# Patient Record
Sex: FEMALE | URBAN - METROPOLITAN AREA
[De-identification: names, ages, dates, MRNs, and addresses within clinical notes are randomized per-mention and may not be internally consistent; named-entity substitution may affect disease eponyms.]

---

## 2024-09-03 ENCOUNTER — TELEPHONE (OUTPATIENT)
Dept: PSYCHOLOGY | Facility: CLINIC | Age: 49
End: 2024-09-03

## 2024-09-05 ENCOUNTER — TELEPHONE (OUTPATIENT)
Dept: PSYCHOLOGY | Facility: CLINIC | Age: 49
End: 2024-09-05

## 2024-09-06 ENCOUNTER — DOCUMENTATION (OUTPATIENT)
Dept: PSYCHIATRY | Facility: CLINIC | Age: 49
End: 2024-09-06

## 2024-09-06 ENCOUNTER — OFFICE VISIT (OUTPATIENT)
Dept: PSYCHIATRY | Facility: CLINIC | Age: 49
End: 2024-09-06
Payer: MEDICARE

## 2024-09-06 ENCOUNTER — OFFICE VISIT (OUTPATIENT)
Dept: PSYCHOLOGY | Facility: CLINIC | Age: 49
End: 2024-09-06
Payer: MEDICARE

## 2024-09-06 DIAGNOSIS — F43.10 PTSD (POST-TRAUMATIC STRESS DISORDER): Chronic | ICD-10-CM

## 2024-09-06 DIAGNOSIS — F31.32 BIPOLAR AFFECTIVE DISORDER, CURRENTLY DEPRESSED, MODERATE (HCC): Primary | ICD-10-CM

## 2024-09-06 DIAGNOSIS — F41.1 GAD (GENERALIZED ANXIETY DISORDER): Chronic | ICD-10-CM

## 2024-09-06 PROBLEM — F90.9 ATTENTION DEFICIT HYPERACTIVITY DISORDER (ADHD): Chronic | Status: ACTIVE | Noted: 2017-03-13

## 2024-09-06 PROBLEM — F31.62 BIPOLAR DISORDER, CURRENT EPISODE MIXED, MODERATE (HCC): Status: ACTIVE | Noted: 2023-08-16

## 2024-09-06 PROCEDURE — G0410 GRP PSYCH PARTIAL HOSP 45-50: HCPCS

## 2024-09-06 PROCEDURE — 90792 PSYCH DIAG EVAL W/MED SRVCS: CPT | Performed by: STUDENT IN AN ORGANIZED HEALTH CARE EDUCATION/TRAINING PROGRAM

## 2024-09-06 PROCEDURE — G0177 OPPS/PHP; TRAIN & EDUC SERV: HCPCS

## 2024-09-06 PROCEDURE — G0176 OPPS/PHP;ACTIVITY THERAPY: HCPCS

## 2024-09-06 RX ORDER — BUPROPION HYDROCHLORIDE 100 MG/1
100 TABLET, EXTENDED RELEASE ORAL DAILY
COMMUNITY
Start: 2024-07-28 | End: 2024-09-06

## 2024-09-06 RX ORDER — TRAZODONE HYDROCHLORIDE 50 MG/1
50 TABLET, FILM COATED ORAL
COMMUNITY

## 2024-09-06 RX ORDER — OLANZAPINE 2.5 MG/1
2.5 TABLET, FILM COATED ORAL
COMMUNITY
Start: 2024-08-29

## 2024-09-06 RX ORDER — PRAZOSIN HYDROCHLORIDE 1 MG/1
2 CAPSULE ORAL DAILY
COMMUNITY
Start: 2024-07-02

## 2024-09-06 RX ORDER — TOPIRAMATE 200 MG/1
200 TABLET, FILM COATED ORAL DAILY
COMMUNITY

## 2024-09-06 RX ORDER — BUPROPION HYDROCHLORIDE 150 MG/1
150 TABLET ORAL DAILY
Qty: 30 TABLET | Refills: 1 | Status: SHIPPED | OUTPATIENT
Start: 2024-09-06

## 2024-09-06 RX ORDER — DEXTROAMPHETAMINE SACCHARATE, AMPHETAMINE ASPARTATE MONOHYDRATE, DEXTROAMPHETAMINE SULFATE AND AMPHETAMINE SULFATE 5; 5; 5; 5 MG/1; MG/1; MG/1; MG/1
30 CAPSULE, EXTENDED RELEASE ORAL EVERY MORNING
COMMUNITY

## 2024-09-06 RX ORDER — HYDROXYZINE HYDROCHLORIDE 25 MG/1
25 TABLET, FILM COATED ORAL 3 TIMES DAILY
COMMUNITY
Start: 2024-08-02
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
## 2024-09-06 NOTE — PSYCH
[facility] Insurance Authorization for Treatment        Subjective:     Patient ID: Zari Nelson is a 49 y.o. female.       Zari Nelson has Medicare as the primary insurance. No contact needed.  Was informed of attendance policy.      Therapist: SHANEL Hurtado

## 2024-09-06 NOTE — BH TREATMENT PLAN
"Assessment/Plan:      Diagnoses and all orders for this visit:    Bipolar affective disorder, currently depressed, moderate (HCC)    EAGLE (generalized anxiety disorder)    PTSD (post-traumatic stress disorder)          Subjective:     Patient ID: Zari Nelson is a 49 y.o. female.    [facility] Treatment Plan   AREAS OF NEED: Bipolar affective disorder, currently depressed, moderate, EAGLE, and PTSD as evidenced by struggling with concentration issues, excessive crying, isolation, decrease in appetite having lost 7lb in a short amount of time,difficulties with sleep, nightmares and has avoidant behavior due to past traumas and work stressors.  Date Initiated: 09/06/24    Strengths:  \"I raised 12 children \"      LONG TERM GOAL:   Date Initiated: 09/06/24  1.0 I will identify and share three ways in which my day-to-day functioning has improved since attending program.  Target Date: 10/4/24  Completion Date:       SHORT TERM OBJECTIVES:     Date Initiated: 09/06/24  1.1 I will attend group daily and actively participate in at least two groups per day to build a natural support network and feel more socially connected to improve my moods.  Revision Date:   Target Date: 9/18/24  Completion Date:     Date Initiated: 09/06/24  1.2 I will decrease negative statements of self while increasing the positive statements of self and will acknowledge the compliments of others in order to increase my self-esteem.  Revision Date:   Target Date: 9/18/24  Completion Date:    Date Initiated: 09/06/24  1.3 I will take medications as prescribed and share questions and concerns if arise.    Revision Date:  Target Date: 9/18/24  Completion Date:     Date Initiated: 09/06/24  1.4 I will identify 3 ways my supports can assist in my recovery and agree to staff/support contact as indicated.    Revision Date:  Target Date: 9/18/24  Completion Date:          7 DAY REVISION:    Date Initiated:  Revision Date:   Target Date:   Completion " Date:      PSYCHIATRY:  Date Initiated:  09/06/24  Medication Management and Education       Revision Date:       The person(s) responsible for carrying out the plan is Elvin Wong DO and Marilyn Lora PA-C    NURSING/SYMPTOM EDUCATION:   Date Initiated: 09/06/24  1.1, 1.2. 1.3, 1.4 This RN/Or Therapist will provide wellness/symptoms and skill education groups three to five days weekly to educate Zari Nelson on signs and symptoms of diagnoses, skills to manage, and medication questions that will be addressed by the treatment team.    Revision date:  The person(s) responsible for carrying out the plan is Janet Feliciano; Ela Maldonado RN, BSN    PSYCHOLOGY:   Date Initiated: 09/06/24       1.1, 1.2, 1.4 Provide psychotherapy group 5 times per week to allow opportunity for Zari Nelson  to explore stressors and ways of coping.   Revision Date:   The person(s) responsible for carrying out the plan is YURI Hurtado LSW; Ashley Costenbader, MA LMT    ALLIED THERAPY:   Date Initiated: 09/06/24  1.1,1.2 Engage Zari Nelson in AT group 5 times weekly to encourage development and use of wellness tools to decrease symptoms and promote recovery through meaningful activity.  Revision Date:       The person(s) responsible for carrying out the plan is Delmis Livingston [facility] ; MATI Downs [facility]    CASE MANAGEMENT:   Date Initiated: 09/06/24      1.0 This  will meet with Zari Nelson  3-4 times weekly to assess treatment progress, discharge planning, connection to community supports and UR as indicated.  Revision Date:   The person(s) responsible for carrying out the plan is YURI Hurtado,SHANEL    TREATMENT REVIEW/COMMENTS:     DISCHARGE CRITERIA: Identify 3 signs of progress and complete a safety plan.    DISCHARGE PLAN: Connect with identified outpatient providers.   Estimated Length of Stay: 10 treatment days             Diagnosis and Treatment Plan  explained to Zari, Zari relates understanding diagnosis and is agreeable to Treatment Plan.        CLIENT COMMENTS / Please share your thoughts, feelings, need and/or experiences regarding your treatment plan with Staff.  Please see follow up note with comments.    Signatures can be found on [facility] Treatment plan consent form.

## 2024-09-06 NOTE — PSYCH
Visit Time     Visit Start Time: 1045  Visit Stop Time: 1145  Total Visit Duration: 60 minutes    Subjective:     Patient ID: Zari Nelson is 49 y.o. y.o. female.    [facility] Clinical Progress Notes      Specialized Services Documentation  Therapist must complete separate progress note for each specific clinical activity in which the individual participated during the day.     Group Psychotherapy     Zari Nelson quietly shared in psychotherapy group exploring facets of wellness through the weekly wellness inventory.   Zari shared in the exploration of physical, emotional, cognitive, personal development, social, spiritual, and activities of daily living growth. Participants were asked to assess their engagement in several active ways to live these areas of wellness. She engaged in a stretching exercise, an object meditation, affirmation reading, and GLAD journaling. offered tools including the Eisenhower Matrix and Emotions Journal strategies. She was quiet yet this was her first treatment group. She was observed to be taking notes.  Some initial progress toward goal. Continue group to explore strategies and awareness for active engagement in one's wellness journey.    Tx Plan Objective: 1.1, Therapist:  Delmis GLEASONBC

## 2024-09-06 NOTE — PSYCH
Group Psychotherapy      Visit Start Time: (1215)  Visit Stop Time: (1315)  Total Visit Duration:  60 minutes    Subjective:     Patient ID: Zari Nelson 49 y.o.     This group was facilitated in a private office.   Zari Nelson participated actively in a psychotherapy group focused on setting boundaries. The group focused on the interpersonal effectiveness pillar of DBT; specifically, looking at the UNA acronym. Participants followed a step by step breakdown of the UNA process and were encouraged to engage in open discussion throughout. Group members were given a UNA practice worksheet and time to practice. Zari Nelson took notes and filled out his practice sheet. Continue to note progress towards goals. Continue with psychotherapy to encourage further development of interpersonal effectiveness skills.  Tx Plan Objective 1.1, 1.2, 1.4 Therapist: AYANA Leon
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
## 2024-09-06 NOTE — PSYCH
Visit Time    Visit Start Time: 1330  Visit Stop Time: 1430  Total Visit Duration:  60 minutes    Subjective:     Patient ID: Zari Nelson is a 49 y.o. female.    [facility] Clinical Progress Notes      Specialized Services Documentation  Therapist must complete separate progress note for each specific clinical activity in which the individual participated during the day.       GROUP PSYCHOTHERAPY    Zari Nelson participated actively in psychotherapy group.  This was observed Consistent throughout the treatment day. They were engaged in learning related to Wellness Tools. Staff utilized Verbal, Written, A/V, and Demonstration teaching methods.  Zari Nelson shared area of learning and set a goal for outside of program to work on self care.  Zari Nelson was able to share items explored during the day and shared in adding to their WRAP.  Ended session with staff led chair yoga exercise .  Zari Nelson demonstrated good progress toward goal.  Continue group psychotherapy to actively practice learned skills and encourage transfer of knowledge to unstructured time.    Tx Plan Objective: 1.1,,1.2, Therapist: Ashley Costenbader, MA LMT

## 2024-09-06 NOTE — PSYCH
Patient left another voice message to schedule.     Ning Werner on 7/14/2023 at 12:49 PM     Visit Time    Visit Start Time: 0830  Visit Stop Time: 0930  Total Visit Duration: 0 minutes    Subjective:     Patient ID: Zari Nelson is a 49 y.o. female.    [facility] Clinical Progress Notes      Specialized Services Documentation  Therapist must complete separate progress note for each specific clinical activity in which the individual participated during the day.     EDUCATION THERAPY    0830-0930    Zari Nelson was not present for group due to intake.     Tx Plan Objective: 1.1,1.2, Therapist: SHANEL Hurtado       Visit Time    Visit Start Time: 0935  Visit Stop Time: 1100  Total Visit Duration:  1 hour and 25  minutes    Subjective:     Patient ID: Zari Nelson is a 49 y.o. female.    [facility] Clinical Progress Notes      Specialized Services Documentation  Therapist must complete separate progress note for each specific clinical activity in which the individual participated during the day.       Case Management Note    Current suicide risk : Low     Medications changes/added/denied? Yes     Treatment session number: Assessment and day 1    Individual Case Management Visit provided today? yes    [facility] follow up physician's orders: Admit to PHP- See 's note         INDIVIDUAL PSYCHOTHERAPY     Met with Imeldacarlottatanner Leslie via [facility].  Reviewed program, initial paperwork reviewed: Consent for Treatment, PHP handbook, HIPPA, General Consent, Client Bill of Rights, and Smoking/Drug and Alcohol Policy. Release of Information obtained for emergency contact - Olga Galeas (mother) [phone] and PCP and Health Care Coordination Form. Eladiotanner Yaquelinhector has hard copies of all paperwork and gave consent. Reviewed and given on call number. PCP notified of admission and health care coordination form sent. Completed initial psycho-social evaluation and initial treatment goals discussed.Program guidelines reviewed. [facility] paperwork was faxed.    [place]  SHANEL Abraham

## 2024-09-06 NOTE — PSYCH
Visit Time    Visit Start Time: 0930  Visit Stop Time: 1030  Total Visit Duration:  0 minutes    Subjective:     Patient ID: Zari Nelson is a 49 y.o. female.    [facility] Clinical Progress Notes      Specialized Services Documentation  Therapist must complete separate progress note for each specific clinical activity in which the individual participated during the day.     Allied Therapy 0930-1030 Zari Nelson was not present in music therapy,  aware.   Tx Plan Objective: n/a, Therapist:  MATI Downs, MT-BC

## 2024-09-09 ENCOUNTER — OFFICE VISIT (OUTPATIENT)
Dept: PSYCHOLOGY | Facility: CLINIC | Age: 49
End: 2024-09-09
Payer: MEDICARE

## 2024-09-09 DIAGNOSIS — F31.32 BIPOLAR AFFECTIVE DISORDER, CURRENTLY DEPRESSED, MODERATE (HCC): Primary | ICD-10-CM

## 2024-09-09 DIAGNOSIS — F43.10 PTSD (POST-TRAUMATIC STRESS DISORDER): Chronic | ICD-10-CM

## 2024-09-09 DIAGNOSIS — F41.1 GAD (GENERALIZED ANXIETY DISORDER): Chronic | ICD-10-CM

## 2024-09-09 PROCEDURE — G0410 GRP PSYCH PARTIAL HOSP 45-50: HCPCS

## 2024-09-09 PROCEDURE — G0177 OPPS/PHP; TRAIN & EDUC SERV: HCPCS

## 2024-09-09 NOTE — PSYCH
Group Psychotherapy      Visit Start Time: (1045)  Visit Stop Time: (1145)  Total Visit Duration:  55 minutes        Group Therapy    Subjective:     Patient ID: Zari Nelson 49 y.o.       This group was facilitated in a private office.  Zari Nelson was with case management for a portion of this session. Zari actively engaged in psychoeducational group about self affirmations. The skill helps to maintain and regulate positive self affirmations and positive self image. Group discovered strategies and statements that help them to manage positive well being on a short term and long term basis. The group talked about understanding the purpose, and meaning of self affirmation in intellectual and emotional expression, regulation , and recognition, and how it affects themselves and others. Teaching on the emphasis of positive self affirmation and self-care, who the group can go to for help was brought up as well. Group was encouraged to ask questions in an open forum at the end of group. Positive progress displayed through engagement in topic, Zari was able to identify several factors that have been impeding her with her mental health. Zari spoke of regaining emotional strength to be an influencing factor for her to be here.  Zari Nelson will continue to engage in psychotherapy to encourage positive self realization.  Treatment Plan Objective 1.1, 1.2, 1.3, 1.4 Therapist: Volodymyr DREW Ed.     Visit Time    Visit Start Time: 1330  Visit Stop Time: 1430  Total Visit Duration:  0 minutes    Subjective:     Patient ID: Zari Nelson is a 49 y.o. female.    [facility] Clinical Progress Notes      Specialized Services Documentation  Therapist must complete separate progress note for each specific clinical activity in which the individual participated during the day.       GROUP PSYCHOTHERAPY    Zari Nelsonwas no present for this session.    Tx Plan Objective: 1.1, 1.2, 1.3,  1.4 , Therapist: Volodymyr Leon

## 2024-09-09 NOTE — PSYCH
Visit Time    Visit Start Time: 0830  Visit Stop Time: 0930  Total Visit Duration:  60 minutes    Subjective:     Patient ID: Zari Nelson is a 49 y.o. female.    [facility] Clinical Progress Notes      Specialized Services Documentation  Therapist must complete separate progress note for each specific clinical activity in which the individual participated during the day.       EDUCATION THERAPY      Zari Nelson quietly shared in morning assessment and goal review. Presented as No Interest related to readiness to learn. Zari Nelson  did not complete goal from last treatment day. Zari Nelson did present with any barriers to learning. Throughout morning group, Zari Nelson participated in mindfulness exercise and movement experience. Zari Nelson made poor progress toward goal. Continue education group to assess willingness to engage, assess transfer of knowledge, and participate in skill development.    Tx Plan Objective: 11,1.2, Therapist: Ashley Costenbader, MA LMDILAN     Visit Time     Visit Start Time: 0930  Visit Stop Time: 1030  Total Visit Duration:  40minutes     Subjective:        Subjective  Patient ID: Zari Nelson is a 49 y.o. y.o. female.     [facility] Clinical Progress Notes       Specialized Services Documentation  Therapist must complete separate progress note for each specific clinical activity in which the individual participated during the day.     Zari Nelson did not actively engaged in an open processing group.  The group discussed  self care time, unhealthily coping skills, and family struggles  . Zari Nelson did participate in the conversations related to the topics. Zari Nelson continues to make progress towards goals through verbal participation in group; to accomplish long term goals continue to utilize skills learned in programming. Continue with psychotherapy to educate and encourage use of wellness  tools. Tx Plan Objective: 1.1,1.2 Therapist: Ashley Costenbader MA LMT

## 2024-09-09 NOTE — PSYCH
"Visit Time    Visit Start Time: 1215  Visit Stop Time: 1315  Total Visit Duration: 60 minutes    Subjective:     Patient ID: Zari Nelson is a 49 y.o. female.    [facility] Clinical Progress Notes      Specialized Services Documentation  Therapist must complete separate progress note for each specific clinical activity in which the individual participated during the day.       Group Psychotherapy Life Skills  ([id number])  Zari Nelson actively engaged in group focused on anxiety and stress solutions. Group members practiced CBT and Mindfulness strategies to manage stress by completing short-activities drawn from \"The Anxiety and Stress Solution Deck\".Group members got to choose a card from one of the four focused areas of CBT: challenge your thoughts, change your behaviors, clarify your feelings, and create calmness. Zari did not chose a card from the pile but practiced the tools listed on the card. Group members were encouraged to provide feedback on the exercise. Zari continues to make progress towards goals through verbal participation in group; to accomplish long term goals continue to utilize skills learned in programming. Continue with psychotherapy to educate and encourage use of wellness tools. Tx Plan Objective: 1.1,1.2 Therapist: YURI Hurtado , SHANEL    Visit Time    Visit Start Time: 1010  Visit Stop Time: 1045  Total Visit Duration:  35 minutes      Case Management Note    Current suicide risk : Low     Medications changes/added/denied? No    Treatment session number: 2    Individual Case Management Visit provided today? Yes     [facility] follow up physician's orders: none at this time        INDIVIDUAL PSYCHOTHERAPY     Zari Nelson actively shared in individual therapy. Zari presented as very tearful.  Zari LeeKari identified that she was triggered this weekend when she received an email from working stating that she \"went AWOL.\" Zari was " informed that [facility] paperwork was sent and requested that she contact the person who signed her out for three days to follow up with them about the missing paperwork.  Zari shared about past traumas and how she felt during an encounter with her supervisor. Zari shared that she slept poorly due to nightmares and flashbacks. This writer utilized supportive interventions.  Reviewing grounding techniques assigned as homework and in session practiced 5,4,3,2,1, object meditation and categories. Fair progress toward goal identified. Next session follow up to include review of coping skills.  Continue individual psychotherapy in order to progress towards goal. Unable to review tx plan with Zari due to this writer needing to facilitate group and Zari needing to leave early. Informed her that another  will review her treatment plan with her today and we could follow up tomorrow if there is any questions.      Treatment Plan Objectives addressed: 1.1, 1.2, 1.3, 1.4       Sagrario Abraham, MSW, LSW

## 2024-09-10 ENCOUNTER — OFFICE VISIT (OUTPATIENT)
Dept: PSYCHOLOGY | Facility: CLINIC | Age: 49
End: 2024-09-10
Payer: MEDICARE

## 2024-09-10 DIAGNOSIS — F31.32 BIPOLAR AFFECTIVE DISORDER, CURRENTLY DEPRESSED, MODERATE (HCC): Primary | ICD-10-CM

## 2024-09-10 DIAGNOSIS — F41.1 GAD (GENERALIZED ANXIETY DISORDER): Chronic | ICD-10-CM

## 2024-09-10 DIAGNOSIS — F43.10 PTSD (POST-TRAUMATIC STRESS DISORDER): Chronic | ICD-10-CM

## 2024-09-10 PROCEDURE — G0177 OPPS/PHP; TRAIN & EDUC SERV: HCPCS

## 2024-09-10 PROCEDURE — G0176 OPPS/PHP;ACTIVITY THERAPY: HCPCS

## 2024-09-10 PROCEDURE — G0410 GRP PSYCH PARTIAL HOSP 45-50: HCPCS

## 2024-09-10 NOTE — PSYCH
Visit Time    Visit Start Time: 0830  Visit Stop Time: 0930  Total Visit Duration:  50 minutes    Subjective:     Patient ID: Zari Nelson is a 49 y.o. female.    [facility] Clinical Progress Notes      Specialized Services Documentation  Therapist must complete separate progress note for each specific clinical activity in which the individual participated during the day.       EDUCATION THERAPY      Zari Nelson actively shared in morning assessment and goal review. Presented as Receptive related to readiness to learn. Zari Nelson  did complete goal from last treatment day identifying she made dinner, gaining responsibility. Zari Nelson did not present with any barriers to learning. Throughout morning group, Zari Nelson participated in mindfulness exercise and movement experience. Zari Nelson made positive progress toward goal. Continue education group to assess willingness to engage, assess transfer of knowledge, and participate in skill development.    Tx Plan Objective: 1.1, 1.2, 1.3, 1.4 , Therapist: Volodymyr Leon    Visit Time    Visit Start Time: 1330  Visit Stop Time: 1430  Total Visit Duration:  60 minutes    Subjective:     Patient ID: Zari Nelson is a 49 y.o. female.    [facility] Clinical Progress Notes      Specialized Services Documentation  Therapist must complete separate progress note for each specific clinical activity in which the individual participated during the day.       GROUP PSYCHOTHERAPY    Zari Nelson participated actively in psychotherapy group.  This was observed Consistent throughout the treatment day. They were engaged in learning related to Aftercare and Wellness Tools. Staff utilized Verbal and Demonstration teaching methods.  Zari Nelson shared area of learning and set a goal for outside of program to clean her room.  Zari Nelson was able to share items explored during the day and shared in  adding to their WRAP.  Ended session with staff led exercise on grounding techniques.  Zari Nelson demonstrated positive progress toward goal.  Continue group psychotherapy to actively practice learned skills and encourage transfer of knowledge to unstructured time.    Tx Plan Objective: 1.1, 1.2, 1.3, 1.4 , Therapist: Volodymyr Leon    Group Psychotherapy      Visit Start Time: (1045)  Visit Stop Time: (1145)  Total Visit Duration:  60 minutes    Subjective:     Patient ID: Zari Nelson 49 y.o.     This group was facilitated in a private office.  Zari Nelson was limited in her engagement in psychoeducational group about the cognitive triad that involves cognitive bias and limiting thoughts that are based on a negative perception of ones self.The skill helps to identify negative thoughts and cognitive biases. The outcome being that negative thoughts are challenged in the thought process and regulation of emotion. Group discovered strategies that help them to manage negative thoughts and rethink the negative thoughts when faced with a negative challenge wether the thought is perceived in the outside environment, or internally as negative self-talk. The group talked about understanding the purpose of challenging negative thoughts on a personal and social level and how it affects themselves and others..Teaching on the emphasis of self monitoring and self-care, the group focus is geared how to go for help when the negative thoughts become overly intrusive. Group was encouraged to ask questions in an open forum at the end of session. Limited progress displayed through engagement in topic, Zari was present for the entire session, but did not participate in the group discussion. Sarah displayed a withdrawn posture, consistently with arms folded and head downward facing and not making frequent eye contact. Zari LeeCapriceale will continue to engage in psychotherapy to encourage positive self  realization.  Treatment Plan Objective 1.1, 1.2, 1.3, 1.4 Therapist: Volodymyr DREW Ed.

## 2024-09-10 NOTE — PSYCH
Subjective:     Patient ID: Zari Nelson is a 49 y.o. female.    [facility] Clinical Progress Notes      Specialized Services Documentation  Therapist must complete separate progress note for each specific clinical activity in which the individual participated during the day.     Visit Time    Visit Start Time: NA  Visit Stop Time: NA  Total Visit Duration: NA      Case Management Note    Current suicide risk : Low     Medications changes/added/denied? No    Treatment session number: 3    Individual Case Management Visit provided today? No    [facility] follow up physician's orders: none at this time        INDIVIDUAL PSYCHOTHERAPY     An individual psychotherapy session is not scheduled today with Zari Nelson ; additionally, they did not request a meeting.  Next scheduled session is 9/12/24.    Treatment Plan Objectives addressed: NOE Abraham MSW, LSW

## 2024-09-10 NOTE — PSYCH
Visit Time    Visit Start Time: 0930  Visit Stop Time: 1030  Total Visit Duration: 60 minutes      Subjective:     Patient ID: Zari Nelson is a 49 y.o. female.     [facility] Clinical Progress Notes      Specialized Services Documentation  Therapist must complete separate progress note for each specific clinical activity in which the individual participated during the day.      Group Psychotherapy - Roadblocks   Zari Nelson participated with prompts in a psychotherapy group focused on roadblocks to wellness. Today group members focused on the roadblocks and solutions that can be inquired for the mental health wellness. Group members also were to complete their own road map that has their own roadblocks and then identify solutions to overcome their roadblocks. The goal of today was for group members to participate in groups discussion on roadblocks and solutions of their anxiety. This writer encouraged members to openly share and integrate coping skills into their daily routine. Zari Nelson made poor efforts towards progress goals which were displayed through participation, notetaking, and engagement in topic.    Tx Plan Objective: 1.1,1.2,1.4 Therapist: Ashley Costenbader MA LMT

## 2024-09-10 NOTE — PSYCH
Visit Time    Visit Start Time: 1215  Visit Stop Time: 1315  Total Visit Duration:  60 minutes    Subjective:     Patient ID: Zari Nelson is a 49 y.o. female.    [facility] Clinical Progress Notes      Specialized Services Documentation  Therapist must complete separate progress note for each specific clinical activity in which the individual participated during the day.     Allied Therapy [id number] Zari Nelson actively participated in music therapy group regarding mindfulness. The group participated in a name that tune exercise as an example of mindfulness. The group then read and discussed materials on mindfulness as well as areas they could use mindfulness in their current lives. The group then read and discussed a handout on a music mindfulness exercise. When prompted, Zari reported grounding as a mindfulness tool they could use outside of program. Some effort noted towards treatment goal. Continue AT to encourage the development and proactive use of mindfulness coping tools. Tx Plan Objective: 1.1,1.2,1.4, Therapist:  MATI Downs, MARIAELENA

## 2024-09-11 ENCOUNTER — OFFICE VISIT (OUTPATIENT)
Dept: PSYCHOLOGY | Facility: CLINIC | Age: 49
End: 2024-09-11
Payer: MEDICARE

## 2024-09-11 DIAGNOSIS — F41.1 GAD (GENERALIZED ANXIETY DISORDER): Chronic | ICD-10-CM

## 2024-09-11 DIAGNOSIS — F31.32 BIPOLAR AFFECTIVE DISORDER, CURRENTLY DEPRESSED, MODERATE (HCC): Primary | ICD-10-CM

## 2024-09-11 DIAGNOSIS — F43.10 PTSD (POST-TRAUMATIC STRESS DISORDER): Chronic | ICD-10-CM

## 2024-09-11 PROCEDURE — G0176 OPPS/PHP;ACTIVITY THERAPY: HCPCS

## 2024-09-11 PROCEDURE — G0410 GRP PSYCH PARTIAL HOSP 45-50: HCPCS

## 2024-09-11 PROCEDURE — G0177 OPPS/PHP; TRAIN & EDUC SERV: HCPCS

## 2024-09-11 NOTE — PSYCH
Visit Time    Visit Start Time: 1330  Visit Stop Time: 1430  Total Visit Duration: 60 minutes    Subjective:     Patient ID: Zari Nelson is a 49 y.o. female.    [facility] Clinical Progress Notes      Specialized Services Documentation  Therapist must complete separate progress note for each specific clinical activity in which the individual participated during the day.     GROUP PSYCHOTHERAPY    [id number]    Zari Nelson participated actively in psychotherapy group.  This was observedConsistent throughout the treatment day. They were engaged in learning related to Illness, Medication, Aftercare, and Wellness Tools. Staff utilized Verbal, Written, A/V, and Demonstration teaching methods.  Zari Nelson shared area of learning and set a goal for outside of program to email the investigator for the EEO.  Zari Nelson was able to share items explored during the day .  Ended session with staff led breathing exercise.  Zari Nelson demonstrated good progress toward goal.  Continue group psychotherapy to actively practice learned skills and encourage transfer of knowledge to unstructured time.    Tx Plan Objective: 1.1,1.2,1.3,1.4 Therapist: SHANEL Hurtado    Visit Time    Visit Start Time: 1135  Visit Stop Time: 1151  Total Visit Duration:  16 minutes      Case Management Note    Current suicide risk : Low     Medications changes/added/denied? No    Treatment session number: 4    Individual Case Management Visit provided today? Yes     [facility] follow up physician's orders: none at this time        INDIVIDUAL PSYCHOTHERAPY     Today was not a scheduled case management day but Zari requested to speak with this writer today and stated that it could not wait until tomorrow. Zari Nelson actively shared in individual therapy.   Zari Nelson shared that she received an email from the EEO investigator about her case and requested more information on the  rebuttal of a the statement. Opal shared that she felt overwhelmed and thought about dropping her case because she beings to ruminate about the situation she she thinks about it. This writer utilized support interventions and problem solving skills.Discussed coping ahead and what coping skills she could use before, during and after the email. Zari was agreeable to try PRM tonight along with other coping skills she is familiar with. PMR assigned as homework. Good progress toward goal identified. Next session follow up to include reviewing challenges or success with using coping tools and trying PMR. Continue individual psychotherapy in order to progress towards goals.     Treatment Plan Objectives addressed: 1.1, 1.2, 1.3, 1.4       Sagrario Abraham, MSW, LSW

## 2024-09-11 NOTE — PSYCH
Visit Time    Visit Start Time: 0830  Visit Stop Time: 0930  Total Visit Duration:  40 minutes    Subjective:     Patient ID: Zari Nelson is a 49 y.o. female.    [facility] Clinical Progress Notes      Specialized Services Documentation  Therapist must complete separate progress note for each specific clinical activity in which the individual participated during the day.       EDUCATION THERAPY      Zari Nelson quietly shared in morning assessment and goal review. Presented as Receptive related to readiness to learn. Zari Nelson  did complete goal from last treatment day identifying she cleaned her bedroom, gaining responsibility. Zari Nelson did not present with any barriers to learning. Throughout morning group, Zari Nelson participated in mindfulness exercise and gratitude practice. Zari Nelson made positive progress toward goal. Continue education group to assess willingness to engage, assess transfer of knowledge, and participate in skill development.    Tx Plan Objective: 1.1, 1.2, 1.3, 1.4 , Therapist: Volodymyr Leon    Group Psychotherapy      Visit Start Time: (1045)  Visit Stop Time: (1145)  Total Visit Duration:  55 minutes    Subjective:     Patient ID: Zari Nelson 49 y.o.    This group was facilitated in a private office.  Zari Nelson engaged in psychoeducational group about emotional regulation. The skill helps to maintain and regulate emotional expression/regulation. Group discovered strategies that help them to manage emotional well being on a short term and long term basis. The group talked about understanding the purpose, and meaning of emotional regulation in intellectual and emotional expression, regulation , and recognition, and how it affects themselves and others.. Teaching on the emphasis of self monitoring and self-care, who the group can go to for help was brought up as well. Group was encouraged to ask questions in an  open forum at the end of group. Limited progress displayed through engagement in topic, Zari was attentive through the group session, but displayed a flat aspect through the session and appeared pre-occupied with ruminating thoughts.  Zari Jasonale will continue to engage in psychotherapy to encourage positive self realization.  Treatment Plan Objective 1.1, 1.2, 1.3, 1.4 Therapist: Volodymyr DREW Ed.

## 2024-09-11 NOTE — PSYCH
Visit Time     Visit Start Time: 0930  Visit Stop Time: 1030  Total Visit Duration: 60 minutes  Subjective:     Patient ID: Zari Nelson is a 49 y.o. female.     [facility] Clinical Progress Notes      Specialized Services Documentation  Therapist must complete separate progress note for each specific clinical activity in which the individual participated during the day.      Group Psychotherapy - Motivation   Zari Nelson participated actively in a psychotherapy group focused on motivation. This writer led a discussion on motivation and procrastination. Group members discussed their personal struggles with motivation and when procrastination occurs. This writer had group member complete a worksheet that identifies where they procrastinate in their life. This writer reviewed the 6 reasons why people procrastinate, what is motivation, and had members  draw what motivation an procrastination feels/looks like to them. This writer encouraged the group members to partake in group discussion and utilize supplemental materials inside and outside of the program.  Zari Nelson made fair efforts towards progress goals which were displayed through participation in the discussion, note taking, and engagement in topic. Zari Nelson shared they would like improve home motivation . Continue to run daily group psychotherapy to meet treatment needs and encourage Zari Nelson to practice skills outside of program.    Tx Plan Objective: 1.1,1.2,1.4 Therapist: Ashley Costenbader MA LMT

## 2024-09-11 NOTE — PSYCH
Visit Time    Visit Start Time: 1215  Visit Stop Time: 1315  Total Visit Duration:  60 minutes    Subjective:     Patient ID: Zari Nelson is a 49 y.o. female.    [facility] Clinical Progress Notes      Specialized Services Documentation  Therapist must complete separate progress note for each specific clinical activity in which the individual participated during the day.     Allied Therapy [id number] Zari Nelson participated in music therapy group regarding healthy music listening. The group discussed their current use of music for self care. The group read and discussed a handout on healthy music listening. The group learned about the Iso Principle and how it can be applied to music listening for self care. The group then created individualized playlists for mood modulation using the above skills. Zari did not share due to time constraints but appeared attentive. Some effort noted towards treatment goal. Continue AT to encourage the development and proactive use of mood modulating techniques. Tx Plan Objective: 1.1,1.2,1.4 Therapist:  MATI Downs, MARIAELENA

## 2024-09-12 ENCOUNTER — OFFICE VISIT (OUTPATIENT)
Dept: PSYCHOLOGY | Facility: CLINIC | Age: 49
End: 2024-09-12
Payer: MEDICARE

## 2024-09-12 DIAGNOSIS — F31.32 BIPOLAR AFFECTIVE DISORDER, CURRENTLY DEPRESSED, MODERATE (HCC): ICD-10-CM

## 2024-09-12 DIAGNOSIS — F41.1 GAD (GENERALIZED ANXIETY DISORDER): Chronic | ICD-10-CM

## 2024-09-12 DIAGNOSIS — F43.10 PTSD (POST-TRAUMATIC STRESS DISORDER): Primary | Chronic | ICD-10-CM

## 2024-09-12 PROCEDURE — G0410 GRP PSYCH PARTIAL HOSP 45-50: HCPCS

## 2024-09-12 PROCEDURE — G0177 OPPS/PHP; TRAIN & EDUC SERV: HCPCS

## 2024-09-12 PROCEDURE — G0176 OPPS/PHP;ACTIVITY THERAPY: HCPCS

## 2024-09-12 NOTE — PSYCH
"Visit Time    Visit Start Time: 1215  Visit Stop Time: 1315  Total Visit Duration:  60 minutes    Subjective:     Patient ID: Zari Nelson is a 49 y.o. female.    [facility] Clinical Progress Notes      Specialized Services Documentation  Therapist must complete separate progress note for each specific clinical activity in which the individual participated during the day.     Allied Therapy [id number] Zari Nelson actively participated in music therapy group regarding time management. The group listened to and discussed the song \"Time\" and how it relates to their philosophy of time management. The group participated in a discussion about time management in there lives and where they could use improvement. The group read and discussed handouts about tips for time management, time wasters, and daily/weekly schedules. The group then discussed the pomodoro method of time management and created a music playlist to as a method of tracking the 25 minute module. Zari identified scheduling time to address anxiety as a way to improve time management in their life. Some effort noted towards treatment goal. Continue AT to encourage the development and proactive use of time management skills. Tx Plan Objective: 1.1,1.2,1.4, Therapist:  MATI Downs, MT-BC    "

## 2024-09-12 NOTE — PSYCH
"Visit Time    Visit Start Time: 0930   Visit Stop Time: 1030   Total Visit Duration: 1 hour    Subjective:     Patient ID: Zari Nelson is a 49 y.o. y.o. female.    [facility] Clinical Progress Notes      Specialized Services Documentation  Therapist must complete separate progress note for each specific clinical activity in which the individual participated during the day.       Psychotherapy Group      Zari Nelson quietly shared in Psychotherapy Group exploring DBT distress tolerance “crisis survival strategies”.  Group explored crisis survival strategies “ACCEPTS, TIP, and STOP\" reinforcing actions one could take to learn to tolerate stressful experiences, thoughts and urges.  Zari participated in STOP examples and distracting with others thoughts/activites to explore several strategies.  She actively created a distress tolerance workbook with acronyms.  She felt she could put effort into practicing senses.  Some progress toward goal noted.  Continue psychotherapy group to encourage learning, practice, and home practice of skills to manage distress.   Tx Plan Objective: 1.1, Therapist:  Delmis FERRELL    "

## 2024-09-12 NOTE — PSYCH
Visit Time    Visit Start Time: 0830  Visit Stop Time: 0930  Total Visit Duration:  60 minutes    Subjective:     Patient ID: Zari Nelson is a 49 y.o. female.    [facility] Clinical Progress Notes      Specialized Services Documentation  Therapist must complete separate progress note for each specific clinical activity in which the individual participated during the day.       EDUCATION THERAPY      Zari Nelson actively shared in morning assessment and goal review. Presented as Receptive related to readiness to learn. Zari Nelson  did complete goal from last treatment day identifying she worked on an email she must respond to for work,gaining responsibility. Zari Nelson did not present with any barriers to learning. Throughout morning group, Zari Nelson participated in mindfulness exercise and movement experience. Zari eNlson made positive progress toward goal. Continue education group to assess willingness to engage, assess transfer of knowledge, and participate in skill development.    Tx Plan Objective: 1.1, 1.2, 1.3, 1.4 , Therapist: Volodymyr Leon    Visit Time    Visit Start Time: 1330  Visit Stop Time: 1430  Total Visit Duration:  60 minutes    Subjective:     Patient ID: Zari Nelson is a 49 y.o. female.    [facility] Clinical Progress Notes      Specialized Services Documentation  Therapist must complete separate progress note for each specific clinical activity in which the individual participated during the day.       GROUP PSYCHOTHERAPY    Zari Nelson participated actively in psychotherapy group.  This was observed Consistent throughout the treatment day. They were engaged in learning related to Wellness Tools. Staff utilized Verbal and Demonstration teaching methods.  Zari Nelson shared area of learning and set a goal for outside of program to make dinner.  Zari Nelson was able to share items explored during the day  and shared in adding to their WRAP.  Ended session with peer  led exercise in breathing techniques.  Zari Nelson demonstrated positive progress toward goal.  Continue group psychotherapy to actively practice learned skills and encourage transfer of knowledge to unstructured time.    Tx Plan Objective: 1.1, 1.2, 1.3, 1.4 , Therapist: Volodymyr Leon    Group Psychotherapy        Visit Start Time: (1045)  Visit Stop Time: (1145)  Total Visit Duration:  60 minutes    Subjective:     Patient ID: aZri Nelson 49 y.o.     This group was facilitated in a private office.  Zari Nelson actively engaged in psychoeducational group about positive self soothing activities. These activities help to self-soothe an individual and decrease decrease feelings of loneliness,anxiety, and boredom. Positive behavior change is focused toward a specified goal. Group explored the identifying factors that create loneliness,anxiety, and boredom. This process can help them to take care of emotional and intellectual moments of anxiety on a short term and long term basis. The group talked about understanding the meaning of self-soothing in regards to physical, intellectual, and emotional expression, regulation , and recognition, and how it affects themselves and others. Teaching on the emphasis of self monitoring and relapse, the group explored who they can go to for help was brought up as well. Group was encouraged to ask questions in an open forum at the end of group. Positive progress displayed through engagement in topic, Zari was a frequent contributor during the five senses activity in group.Zari Nelson will continue to engage in psychotherapy to encourage positive self realization.  Treatment Plan Objective 1.1, 1.2, 1.3, 1.4 Therapist: Volodymyr DREW Ed.

## 2024-09-12 NOTE — PSYCH
Subjective:     Patient ID: Zari Nelson is a 49 y.o. female.    [facility] Clinical Progress Notes      Specialized Services Documentation  Therapist must complete separate progress note for each specific clinical activity in which the individual participated during the day.     Visit Time    Visit Start Time: NA  Visit Stop Time: NA  Total Visit Duration: NA      Case Management Note    Current suicide risk : Low     Medications changes/added/denied? No    Treatment session number: 5    Individual Case Management Visit provided today? No    [facility] follow up physician's orders: none at this time        INDIVIDUAL PSYCHOTHERAPY     An individual psychotherapy session is not scheduled today with Zari Nelson ; additionally, they did not request a meeting.  Next scheduled session is 9/13/24.    Treatment Plan Objectives addressed: NOE Abraham MSW, LSW

## 2024-09-13 ENCOUNTER — DOCUMENTATION (OUTPATIENT)
Dept: PSYCHOLOGY | Facility: CLINIC | Age: 49
End: 2024-09-13

## 2024-09-13 ENCOUNTER — APPOINTMENT (OUTPATIENT)
Dept: PSYCHOLOGY | Facility: CLINIC | Age: 49
End: 2024-09-13
Payer: MEDICARE

## 2024-09-13 NOTE — PROGRESS NOTES
Subjective:     Patient ID: Zari Nelson is a 49 y.o. female.    [facility] Clinical Progress Notes      Specialized Services Documentation  Therapist must complete separate progress note for each specific clinical activity in which the individual participated during the day.     Case Management Note    YURI Hurtado    Current suicide risk : Unable to assess due to absence.    Zari Nelson was excused from  program today 09/13/24 due to feeling sick and having a bad migraine . CM contacted Zari Nelson at 0828.    Medications changes/added/denied? No    Treatment session number: N/A    Individual Case Management Visit provided today? No    [facility] follow up physician's orders: None at this time.

## 2024-09-13 NOTE — PSYCH
"Group Psychotherapy      Visit Start Time: (1045)  Visit Stop Time: (1145)  Total Visit Duration: {Psych Total Visit Time:58388}      Group Therapy    Subjective:     Patient ID: Zari Nelson 49 y.o.     This group was facilitated in a private office   Zari Nelson actively *** engaged in psychoeducational group about radical acceptance. The skill helps an individual to learn to accept situations that are out of ones control. Reducing denial of situations and reducing distress..Group discovered strategies that help them to manage emotional well being on a short term and long term basis. The introduction of the concept of \"wise mid\" is used in this process.The group talked about understanding the purpose and meaning radical acceptance with  \"wise mind\" ,regulation , recognition, and how it affects themselves and others..Teaching on the emphasis of radical acceptance, who the group can go to for help was brought up as well. Group was encouraged to ask questions in an open forum at the end of group. *** progress displayed through engagement in topic. Zari Nelson will continue to engage in psychotherapy to encourage positive self realization.  Treatment Plan Objective 1.1, 1.2, 1.3, 1.4 Therapist: Volodymyr DREW Ed.   "

## 2024-09-16 ENCOUNTER — APPOINTMENT (OUTPATIENT)
Dept: PSYCHOLOGY | Facility: CLINIC | Age: 49
End: 2024-09-16
Payer: MEDICARE

## 2024-09-16 DIAGNOSIS — F41.1 GAD (GENERALIZED ANXIETY DISORDER): Chronic | ICD-10-CM

## 2024-09-16 DIAGNOSIS — F31.32 BIPOLAR AFFECTIVE DISORDER, CURRENTLY DEPRESSED, MODERATE (HCC): ICD-10-CM

## 2024-09-16 DIAGNOSIS — F43.10 PTSD (POST-TRAUMATIC STRESS DISORDER): Primary | Chronic | ICD-10-CM

## 2024-09-16 PROCEDURE — G0177 OPPS/PHP; TRAIN & EDUC SERV: HCPCS

## 2024-09-16 PROCEDURE — G0410 GRP PSYCH PARTIAL HOSP 45-50: HCPCS

## 2024-09-16 PROCEDURE — G0176 OPPS/PHP;ACTIVITY THERAPY: HCPCS

## 2024-09-16 NOTE — PSYCH
Visit Time    Visit Start Time: 0830  Visit Stop Time: 0930  Total Visit Duration:  60 minutes    Subjective:     Patient ID: Zari Nelson is a 49 y.o. female.    [facility] Clinical Progress Notes      Specialized Services Documentation  Therapist must complete separate progress note for each specific clinical activity in which the individual participated during the day.       EDUCATION THERAPY      Zari Nelson actively shared in morning assessment and goal review. Presented as Receptive related to readiness to learn. Zari Nelson  did complete goal from last treatment day identifying gaining responsibility. Zari Nelson did not present with any barriers to learning. Throughout morning group, Zari Nelson participated in mindfulness exercise. Zari Nelson made good progress toward goal. Continue education group to assess willingness to engage, assess transfer of knowledge, and participate in skill development.    Tx Plan Objective: 1.2, Therapist: Ashley Costenbader, MA LMT     Visit Time    Visit Start Time: 1330  Visit Stop Time: 1430  Total Visit Duration:  10 minutes    Subjective:     Patient ID: Zari Nelson is a 49 y.o. female.    [facility] Clinical Progress Notes      Specialized Services Documentation  Therapist must complete separate progress note for each specific clinical activity in which the individual participated during the day.       GROUP PSYCHOTHERAPY    Zari Nelson participated actively in psychotherapy group.  This was observed Consistent throughout the treatment day. They were engaged in learning related to Wellness Tools. Staff utilized Verbal, Written, A/V, and Demonstration teaching methods.  Zari Nelson shared area of learning and was unable to set a goal for outside of program due to being in case management.  Zari Nelson was able to share items explored during the day and shared in adding to their  WRAP.  Ended session with staff led exercise guided mindfulness.  Zari Nelson demonstrated fair progress toward goal.  Continue group psychotherapy to actively practice learned skills and encourage transfer of knowledge to unstructured time.    Tx Plan Objective: 1.2, Therapist: Ashley Costenbader, MA LMT       Subjective:     Patient ID: Zari Nelson is a 49 y.o. female.     [facility] Clinical Progress Notes      Specialized Services Documentation  Therapist must complete separate progress note for each specific clinical activity in which the individual participated during the day.      Group Psychotherapy - Habit Stacking  [id number] Zari Nelson participated actively in a psychotherapy group focused on habit stacking. This writer led a discussion on healthily and unhealthy habits and which group member use most. Group members discussed their personal use of habits and how it correlates to their current routines. This writer shared a video detailing how to practice the habit stacking skill. At the conclusion of the video group members were encouraged to reflect on their takeaway. This writer reviewed the 9 rules of habit stacking while encouraging group members to read along, take notes, and share their experience. This writer encouraged the group members to partake in group discussion and utilize supplemental materials inside and outside of the program.  Zari Nelson made fair efforts towards progress goals which were displayed through participation in the discussion, note taking, and engagement in topic.  Continue to run daily group psychotherapy to meet treatment needs and encourage Zari Nelson to practice skills outside of program.    Tx Plan Objective: 1.1,1.2,1.4 Therapist: Ashley Costenbader MA LMT

## 2024-09-16 NOTE — PSYCH
Group Psychotherapy      Visit Start Time: (930)  Visit Stop Time: (1030)  Total Visit Duration:  60 minutes    Subjective:     Patient ID: Zari Nelson 49 y.o.    This group was facilitated in a private office.  Zari Nelson actively engaged in psychoeducational group about employment stressors. The skill helps to create effective work relationships. The group discovered strategies that help them to manage work relationships on a short term and long term basis. The group talked about understanding the purpose, and meaning of work stressors  in intellectual and emotional expression, regulation , and recognition, and how it affects themselves and others.. Teaching on the emphasis of self monitoring , suggestive solutions, verbal and non-verbal communication,and keeping commitments, strategies were discusses to reduce work stressors.  Group was encouraged to ask questions in an open forum at the end of group. Positive progress displayed through engagement in topic, Kenisha spoke of harrassment at her employment and the steps she has been taking to have the issue resolved in the work place.  Zari Nelson will continue to engage in psychotherapy to encourage positive self realization.  Treatment Plan Objective 1.1, 1.2, 1.3, 1.4 Therapist: Volodymyr DREW Ed.

## 2024-09-16 NOTE — PSYCH
Subjective:     Patient ID: Zair Nelson is a 49 y.o. female.    [facility] Clinical Progress Notes      Specialized Services Documentation  Therapist must complete separate progress note for each specific clinical activity in which the individual participated during the day.     Visit Time    Visit Start Time: 1345  Visit Stop Time: 1435  Total Visit Duration:  50 minutes      Case Management Note    Current suicide risk : Low     Medications changes/added/denied? No    Treatment session number: 7    Individual Case Management Visit provided today? Yes     [facility] follow up physician's orders: none at this time        INDIVIDUAL PSYCHOTHERAPY     Zari Neslon actively shared in individual therapy and presented as tearful   Zari Nelson identified that her daughter has been struggling with mental health and is displaying some of the unhealthy coping skills that she does such as isolating, lack of appetite, not speaking to others. Zari shared about her daughters mental health and expressed guilt/shame for her daughters mental health. She reports that in 2020 she took a lot of how she was feeling out on her daughter. Discussed her speaking with her daughter and possibly having family therapy session and reviewing material together. Discussed Zari setting time aside to spend with her daughter doing something she enjoys.Reviewed opposite action. Zari worked on CBT techniques and worked on reframing her thoughts. Reviewed cognitive distortions and Zari was able to identify the types of distortions she was having. Reviewed though record sheet and encouraged Zari utilize thought record sheet. This writer utilized CBT interventions.  Reframing 1 thought assigned as homework . Some progress toward goal identified. Meditation and breathing techniques have been helpful. Continue individual psychotherapy in order to Progress towards goals.     Treatment Plan Objectives  addressed: 1.1, 1.2, 1.3, 1.4       Sagrario Abraham, MSW, LSW

## 2024-09-17 ENCOUNTER — APPOINTMENT (OUTPATIENT)
Dept: PSYCHOLOGY | Facility: CLINIC | Age: 49
End: 2024-09-17
Payer: MEDICARE

## 2024-09-17 DIAGNOSIS — F31.62 BIPOLAR DISORDER, CURRENT EPISODE MIXED, MODERATE (HCC): Primary | ICD-10-CM

## 2024-09-17 DIAGNOSIS — F41.1 GAD (GENERALIZED ANXIETY DISORDER): Chronic | ICD-10-CM

## 2024-09-17 DIAGNOSIS — F43.10 PTSD (POST-TRAUMATIC STRESS DISORDER): Chronic | ICD-10-CM

## 2024-09-17 PROCEDURE — G0410 GRP PSYCH PARTIAL HOSP 45-50: HCPCS

## 2024-09-17 PROCEDURE — G0177 OPPS/PHP; TRAIN & EDUC SERV: HCPCS

## 2024-09-17 PROCEDURE — G0176 OPPS/PHP;ACTIVITY THERAPY: HCPCS

## 2024-09-17 NOTE — PSYCH
Visit Time    Visit Start Time: 0830  Visit Stop Time: 0930  Total Visit Duration:  30 minutes    Subjective:     Patient ID: Zari Nelson is a 49 y.o. female.    [facility] Clinical Progress Notes      Specialized Services Documentation  Therapist must complete separate progress note for each specific clinical activity in which the individual participated during the day.       EDUCATION THERAPY      Zari Nelson quietly shared in morning assessment and goal review. Presented as Receptive related to readiness to learn. Zari Nelson  did not complete goal from last treatment day identifying she wanted to talk to her daughter about her mental health, gaining support. Zari Nelson did present with any barriers to learning, having come to the group session a half hour late.  Throughout morning group, Zari Nelson participated in mindfulness exercise and movement experience. Zari Nelson made limited progress toward goal. Continue education group to assess willingness to engage, assess transfer of knowledge, and participate in skill development.    Tx Plan Objective: 1.1, 1.2, 1.3, 1.4 , Therapist: Volodymyr Leon    Visit Time    Visit Start Time: 1330  Visit Stop Time: 1430  Total Visit Duration:  60 minutes    Subjective:     Patient ID: Zari Nelson is a 49 y.o. female.    [facility] Clinical Progress Notes      Specialized Services Documentation  Therapist must complete separate progress note for each specific clinical activity in which the individual participated during the day.       GROUP PSYCHOTHERAPY    Zari Nelson participated actively in psychotherapy group.  This was observed Consistent throughout the treatment day. They were engaged in learning related to Wellness Tools. Staff utilized Verbal and Demonstration teaching methods.  Zari Nelson shared area of learning and set a goal for outside of program to talk to her daughter about her  mental health and the WRAP exercise.  Zari Nelson was able to share items explored during the day and shared in adding to their WRAP.  Ended session with peer  led exercise on meditation.  Zari Nelson demonstrated positive progress toward goal.  Continue group psychotherapy to actively practice learned skills and encourage transfer of knowledge to unstructured time.    Tx Plan Objective: 1.1, 1.2, 1.3, 1.4 , Therapist: Volodymyr Leon    Group Psychotherapy      Visit Start Time: (1215)  Visit Stop Time: (1315)  Total Visit Duration:  15 minutes    Subjective:     Patient ID: Zari Nelson 49 y.o.    This group was facilitated in a private office.  Zari Nelson was in case management for a portion of this session. Zari actively engaged in psychoeducational group about motivation. The skill helps to explore purpose of motivation and the limiting beliefs that hold back motivation The group discovered strategies that help them to develop motivation and the potential barriers on a short term and long term basis. The group talked about understanding the purpose, and meaning of motivation in intellectual and emotional expression, regulation , and recognition, and how it affects themselves and others. Teaching on the emphasis of self monitoring , suggestive solutions, verbal and non-verbal communication, keeping commitments, and strategies were discusses to reduce limited motivation.  Group was encouraged to ask questions in an open forum at the end of group. Positive progress displayed through engagement in topic, Zari was engaged for the discussion about importance of daily activities versus the urgency I which they must be done. Zari Nelson will continue to engage in psychotherapy to encourage positive self realization.  Treatment Plan Objective 1.1, 1.2, 1.3, 1.4 Therapist: Volodymyr DREW Ed.

## 2024-09-17 NOTE — PSYCH
"  Subjective:     Patient ID: Zari Nelson is a 49 y.o. female.    [facility] Clinical Progress Notes      Specialized Services Documentation  Therapist must complete separate progress note for each specific clinical activity in which the individual participated during the day.       Visit Time    Visit Start Time: 1215  Visit Stop Time: 1300  Total Visit Duration:  45 minutes      Case Management Note    Current suicide risk : Low     Medications changes/added/denied? No    Treatment session number: 7    Individual Case Management Visit provided today? Yes     [facility] follow up physician's orders: none at this time        INDIVIDUAL PSYCHOTHERAPY     Zari Nelson actively shared in individual therapy.   Zari Nelson shared that she did not get to speak with her daughter last night due to her daughter sleeping. She reports that she did talk with her daughter this morning and they plan to sit down tonight and discuss what Zari is learning in groups and work on their WRAP's together. Zari reports that her daughter and her would like to get therapy together. Zari reports that she spoke with her son from [place] today and they discussed her affection towards her children. Zari stated that \"It was a realization that I never showed my affection and love towards my children the way that they wanted me too. \" Zari shared that she would like to work on expressing her affection towards those that she loves. This writer briefly reviewed and printed off the attachment styles powerpoint and encouraged her to review it tonight. Shanta shared about her family history and how it effects her now. This writer reminded Zari about worry time and encouraged her to try and implement it. Also discussed Zari checking the facts of situations before jumping to conclusions. Zari shared that she can relate to the cognitive distortions which were reviewed.This writer utilized " supportive interventions.  Writing a list of coping skill learned assigned as homework . Zari tx plan was updated and goals were reviewed. Opal shared that PMR was helpful for relieving tension. Good progress toward goal identified. Continue individual psychotherapy in order to make progress.     Treatment Plan Objectives addressed: 1.1, 1.2, 1.3, 1.4       Sagrario Abraham MSW, LSW

## 2024-09-17 NOTE — BH TREATMENT PLAN
"Assessment/Plan:         Assessment  Diagnoses and all orders for this visit:     Bipolar affective disorder, currently depressed, moderate (HCC)     EAGLE (generalized anxiety disorder)     PTSD (post-traumatic stress disorder)              Subjective:        Subjective  Patient ID: Zari Nelson is a 49 y.o. female.     [facility] Treatment Plan   AREAS OF NEED: Bipolar affective disorder, currently depressed, moderate, EAGLE, and PTSD as evidenced by struggling with concentration issues, excessive crying, isolation, decrease in appetite having lost 7lb in a short amount of time,difficulties with sleep, nightmares and has avoidant behavior due to past traumas and work stressors.  Date Initiated: 09/06/24     Strengths:  \"I raised 12 children \"           LONG TERM GOAL:   Date Initiated: 09/06/24  1.0 I will identify and share three ways in which my day-to-day functioning has improved since attending program.  Target Date: 10/4/24  Completion Date:         SHORT TERM OBJECTIVES:      Date Initiated: 09/06/24  1.1 I will attend group daily and actively participate in at least two groups per day to build a natural support network and feel more socially connected to improve my moods.  Revision Date: 9/17/24  Target Date: 9/17/24  Completion Date:      Date Initiated: 09/06/24  1.2 I will decrease negative statements of self while increasing the positive statements of self and will acknowledge the compliments of others in order to increase my self-esteem.  Revision Date: 9/17/24  Target Date: 9/17/24  Completion Date:     Date Initiated: 09/06/24  1.3 I will take medications as prescribed and share questions and concerns if arise.    Revision Date:9/17/24  Target Date: 9/17/24  Completion Date:      Date Initiated: 09/06/24  1.4 I will identify 3 ways my supports can assist in my recovery and agree to staff/support contact as indicated.    Revision Date:9/17/24  Target Date: 9/17/24  Completion Date:            7 " DAY REVISION:   1.5 On a daily basis I will learn and practice a new coping skill to help improve my mood.   Date Initiated:9/17/24  Revision Date:   Target Date: 9/26/24  Completion Date:        PSYCHIATRY:  Date Initiated:  09/06/24  Medication Management and Education       Revision Date: 09/17/24        1.3 Continue medication management        The person(s) responsible for carrying out the plan is Elvin Wong DO and Marilyn Lora PA-C     NURSING/SYMPTOM EDUCATION:   Date Initiated: 09/06/24  1.1, 1.2. 1.3, 1.4 This RN/Or Therapist will provide wellness/symptoms and skill education groups three to five days weekly to educate Zari Nelson on signs and symptoms of diagnoses, skills to manage, and medication questions that will be addressed by the treatment team.    Revision date:09/17/24   1.1,1.2,1.3,1.4,1.5 Continue to encourage Zari Nelson to participate in wellness/symptoms and skills education groups daily to learn about symptoms, coping strategies and warning signs to promote relapse prevention.     The person(s) responsible for carrying out the plan is Janet Feliciano; Ela Maldonado RN, BSN     PSYCHOLOGY:   Date Initiated: 09/06/24       1.1, 1.2, 1.4 Provide psychotherapy group 5 times per week to allow opportunity for Zari Nelson  to explore stressors and ways of coping.   Revision Date: 09/17/24   1.1,1.2,1.4,1.5  Continue to provide psychotherapy group daily to Zari Nelson and encourage sharing of stressors, skills and positive change.    The person(s) responsible for carrying out the plan is Sagrario Abraham, MSW,LSW; Ashley Costenbader, MA LMT     ALLIED THERAPY:   Date Initiated: 09/06/24  1.1,1.2 Engage Zari Nelson in AT group 5 times weekly to encourage development and use of wellness tools to decrease symptoms and promote recovery through meaningful activity.  Revision Date: 09/17/24   1.1,1.2,1.5 Continue to engage Zari Nelson to  participate in AT group to practice wellness tools within program and transfer to home sharing successes and barriers through healthy task involvement.        The person(s) responsible for carrying out the plan is Delmis Livingston [facility] ; MATI Downs, [facility]     CASE MANAGEMENT:   Date Initiated: 09/06/24      1.0 This  will meet with Zari Nelson  3-4 times weekly to assess treatment progress, discharge planning, connection to community supports and UR as indicated.  Revision Date: 09/17/24   1.0 Continue to meet with Zari Nelson 3-4 times weekly to assess growth, work toward goals, continued treatment needs, dc planning and use of supports.    The person(s) responsible for carrying out the plan is YURI Hurtado,ZANW     TREATMENT REVIEW/COMMENTS:      DISCHARGE CRITERIA: Identify 3 signs of progress and complete a safety plan.    DISCHARGE PLAN: Connect with identified outpatient providers.   Estimated Length of Stay: 10 treatment days                Diagnosis and Treatment Plan explained to Zari, Zari relates understanding diagnosis and is agreeable to Treatment Plan.         CLIENT COMMENTS / Please share your thoughts, feelings, need and/or experiences regarding your treatment plan with Staff.  Please see follow up note with comments.     Signatures can be found on [facility] Treatment plan consent form.

## 2024-09-17 NOTE — PSYCH
"Visit Time    Visit Start Time: 1045  Visit Stop Time: 1145  Total Visit Duration:  60 minutes    Subjective:     Patient ID: Zari Nelson is a 49 y.o. female.    [facility] Clinical Progress Notes      Specialized Services Documentation  Therapist must complete separate progress note for each specific clinical activity in which the individual participated during the day.     Allied Therapy [id number] Zari Nelson actively participated in music therapy group regarding grief and loss. The group participated in a discussion about their own experiences with grief and loss. The group then participated in a stew discussion on the song \"Orbsteret Flowers\". The group read and discussed handouts regarding the stages of grief and general facts about grief. The group participated in a stew discussion on \"Because of You\". Zari shared her thoughts on the songs and engaged in group discussion with other group members. Some effort noted towards treatment goal. Continue AT to encourage the development and proactive use of coping techniques.  Tx Plan Objective: 1.1,1.2,1.4, Therapist:  Eusebio Ham, [facility], [place]-BC  "

## 2024-09-17 NOTE — PSYCH
Visit Time    Visit Start Time: 0930  Visit Stop Time: 1030  Total Visit Duration:  60 minutes      Subjective:     Patient ID: Zari Nelson is a 49 y.o. female.     [facility] Clinical Progress Notes      Specialized Services Documentation  Therapist must complete separate progress note for each specific clinical activity in which the individual participated during the day.      Group Psychotherapy - Cognitive Distortions     Members reviewed and discussed the 15 types of cognitive distortions. This writer facilitated a discussion on distortion triggers, the associated experience of emotions, and which distortions resonated with each member. Members evaluated their cognitive distortions and which they use most frequently. Members were provided worksheets to begin challenging their cognitive distortions outside of program. This writer encouraged members to actively seek out and challenge distorted thoughts using these handouts. Zari Nelson made good efforts towards progress goals which were displayed through participation, notetaking, and engagement in topic. Continue to run daily group psychotherapy to meet treatment needs and encourage Zari Nelson to practice skills outside of program.    Tx Plan Objective: 1.1,1.2,1.4 Therapist: Ashley Costenbader MA LMT

## 2024-09-18 ENCOUNTER — APPOINTMENT (OUTPATIENT)
Dept: PSYCHOLOGY | Facility: CLINIC | Age: 49
End: 2024-09-18
Payer: MEDICARE

## 2024-09-18 DIAGNOSIS — F31.62 BIPOLAR DISORDER, CURRENT EPISODE MIXED, MODERATE (HCC): Primary | ICD-10-CM

## 2024-09-18 DIAGNOSIS — F43.10 PTSD (POST-TRAUMATIC STRESS DISORDER): Chronic | ICD-10-CM

## 2024-09-18 DIAGNOSIS — F41.1 GAD (GENERALIZED ANXIETY DISORDER): Chronic | ICD-10-CM

## 2024-09-18 PROCEDURE — G0176 OPPS/PHP;ACTIVITY THERAPY: HCPCS

## 2024-09-18 PROCEDURE — G0177 OPPS/PHP; TRAIN & EDUC SERV: HCPCS

## 2024-09-18 PROCEDURE — G0410 GRP PSYCH PARTIAL HOSP 45-50: HCPCS

## 2024-09-18 NOTE — PSYCH
Visit Time    Visit Start Time: 0830  Visit Stop Time: 0930  Total Visit Duration:  60 minutes    Subjective:     Patient ID: Zari Nelson is a 49 y.o. female.    [facility] Progress Notes      Specialized Services Documentation  Therapist must complete separate progress note for each specific clinical activity in which the individual participated during the day.       EDUCATION THERAPY      Zari Nelson actively shared in morning assessment and goal review. Presented as Receptive related to readiness to learn. Zari Nelson  did complete goal from last treatment day identifying gaining support. Zari Nelson did not present with any barriers to learning. Throughout morning group, Zari Nelson participated in  journal prompt . Zari Nelson made good progress toward goal. Continue education group to assess willingness to engage, assess transfer of knowledge, and participate in skill development.    Tx Plan Objective: 1.1,1.2, Therapist: Ashley Costenbader, MA LMDILAN     Visit Time    Visit Start Time: 1330  Visit Stop Time: 1430  Total Visit Duration:  60 minutes    Subjective:     Patient ID: Zari Nelson is a 49 y.o. female.    [facility] Progress Notes      Specialized Services Documentation  Therapist must complete separate progress note for each specific clinical activity in which the individual participated during the day.       GROUP PSYCHOTHERAPY    Zari Nelson participated actively in psychotherapy group.  This was observed Consistent throughout the treatment day. They were engaged in learning related to Wellness Tools. Staff utilized Verbal, Written, A/V, and Demonstration teaching methods.  Zari Nelson shared area of learning and set a goal for outside of program to complete laundry.  Zari Nelson was able to share items explored during the day and shared in adding to their WRAP.  Ended session with staff led  exercise chair yoga.  Zari Nelson demonstrated good progress toward goal.  Continue group psychotherapy to actively practice learned skills and encourage transfer of knowledge to unstructured time.    Tx Plan Objective: 1.1,1.2, Therapist: Ashley Costenbader, MA LMT

## 2024-09-18 NOTE — PSYCH
Group Psychotherapy      Visit Start Time: (1045)  Visit Stop Time: (1145)  Total Visit Duration:  35 minutes  Group Therapy    Subjective:     Patient ID: Zari Nelson 49 y.o.     This group was facilitated in a private office.  Zari Nelson was in case management for a portion of this session. Zari actively engaged in psychoeducational group about coping with loneliness. The skill helps to identify way to decrease feeling of loneliness and behavior change focused toward a specified goal. Group explored the identifying factors that create loneliness, this process can help them to take care of emotional and intellectual moments of loneliness on a short term and long term basis. The group talked about understanding the meaning of loneliness in regards to physical, intellectual, and emotional expression, regulation , and recognition, and how it affects themselves and others. Teaching on the emphasis of self monitoring and relapse, the group explored who they can go to for help was brought up as well. Group was encouraged to ask questions in an open forum at the end of group. Zari progress displayed through engagement in topic, Zari spoke of having anxiety when in stores with a large group of people, but acknowledge she had been managing going into the stores with several coping skills she utilizes.Zari Nelson will continue to engage in psychotherapy to encourage positive self realization.  Treatment Plan Objective 1.1, 1.2, 1.3, 1.4 Therapist: Volodymyr DREW Ed.
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
## 2024-09-18 NOTE — PSYCH
Visit Time    Visit Start Time: 0930  Visit Stop Time: 1030  Total Visit Duration: 60 minutes    Subjective:     Patient ID: Zari Nelson is a 49 y.o. female.    [facility] Clinical Progress Notes      Specialized Services Documentation  Therapist must complete separate progress note for each specific clinical activity in which the individual participated during the day.       Group Psychotherapy Life Skills (0930-1030)  Zari Nelson actively engaged in group focused on core beliefs.Group members watched a short video, Healing Your Negative Core Beliefs. Group members learned and share about their core beliefs. The group discussed how core beliefs influence their thoughts and behaviors. During the activity the group learned about cognitive reframing and explored ways to reshape their negative core beliefs into positive beliefs. Group members wrote one of their negative core beliefs and had to provide three pieces of evidence that prove the negative core belief untrue. The group learned how to re-frame their irrational thoughts and turn them into positive thoughts. Zari shared how they reframed their thought. Discussed thought records and how to use them. Group members were asked to challenge one negative thought per day. Zari continues to make progress towards goals through verbal participation in group; to accomplish long term goals continue to utilize skills learned in programming. Continue with psychotherapy to educate and encourage use of wellness tools. Tx Plan Objective: 1.1,1.2 Therapist: YURI Hurtado, SHANEL     Visit Time    Visit Start Time: NA  Visit Stop Time: NA  Total Visit Duration: NA      Case Management Note    Current suicide risk : Low     Medications changes/added/denied? No    Treatment session number: 8    Individual Case Management Visit provided today? No    [facility] follow up physician's orders: none at this time        INDIVIDUAL PSYCHOTHERAPY     An  individual psychotherapy session is not scheduled today with Zari Nelson ; additionally, they did not request a meeting.  Next scheduled session is 9/29/24.    Treatment Plan Objectives addressed: NOE Abraham, MSW, LSW

## 2024-09-19 ENCOUNTER — APPOINTMENT (OUTPATIENT)
Dept: PSYCHOLOGY | Facility: CLINIC | Age: 49
End: 2024-09-19
Payer: MEDICARE

## 2024-09-19 DIAGNOSIS — F43.10 PTSD (POST-TRAUMATIC STRESS DISORDER): Chronic | ICD-10-CM

## 2024-09-19 DIAGNOSIS — F31.62 BIPOLAR DISORDER, CURRENT EPISODE MIXED, MODERATE (HCC): Primary | ICD-10-CM

## 2024-09-19 DIAGNOSIS — F41.1 GAD (GENERALIZED ANXIETY DISORDER): Chronic | ICD-10-CM

## 2024-09-19 PROCEDURE — G0410 GRP PSYCH PARTIAL HOSP 45-50: HCPCS

## 2024-09-19 PROCEDURE — G0177 OPPS/PHP; TRAIN & EDUC SERV: HCPCS

## 2024-09-19 NOTE — PSYCH
Visit Time    Visit Start Time: 1215  Visit Stop Time: 1315  Total Visit Duration: 60 minutes      Subjective:     Patient ID: Zari Nelson is a 49 y.o. female.    [facility] Clinical Progress Notes      Specialized Services Documentation  Therapist must complete separate progress note for each specific clinical activity in which the individual participated during the day.       Group Psychotherapy  This group was facilitated in a private office.  ([id number]) Zari Nelson attended group- Wellness Recovery Action Plan. Today, members focused on completing WRAP's toolbox then following specific sections and encouraged to fill in coping tools from their toolbox for planned responses:   Daily Plan     Group members shared pieces of information from these sections and identified their importance. Writer encouraged the members of group to continue utilizing the packet to develop plans inside and outside of program. Some effort towards progress of goals which were displayed through participation and engagement in topic.  Treatment Plan Objectives: 1.1, 1.2  Therapist: Ela LATHAM RN

## 2024-09-19 NOTE — PSYCH
Visit Time    Visit Start Time: 0930  Visit Stop Time: 1030  Total Visit Duration: 60 minutes    Subjective:     Patient ID: Zari Nelson is a 49 y.o. female.    [facility] Clinical Progress Notes      Specialized Services Documentation  Therapist must complete separate progress note for each specific clinical activity in which the individual participated during the day.       Group Psychotherapy Life Skills Group (0930-1030) Zari Nelson actively engaged in group related to mindfulness. Group members played the mindfulness game which consisted of true/false statements, visualization cards, and mindfulness practice cards. Group members engaged in mindfulness activities. Group members discussed the importance of mindfulness. Zari identified how they could practice mindfulness today. Zari Nelson continues to make progress towards goals through verbal participation in group; to accomplish long term goals continue to utilize skills learned in programming. Continue with psychotherapy to educate and encourage use of wellness tools. Tx Plan Objective: 1.1,1.2 Therapist: YURI Hurtado,SHANEL    Visit Time    Visit Start Time: 1030  Visit Stop Time: 1036  Total Visit Duration:  6 minutes      Case Management Note    Current suicide risk : Low     Medications changes/added/denied? No    Treatment session number: 9    Individual Case Management Visit provided today? Yes     [facility] follow up physician's orders: none at this time        INDIVIDUAL PSYCHOTHERAPY     Zari Nelson inquired about her discharge plan and reports that she feels she could use more time in program and is finding it helpful. She shared that she is starting to open up more in group. Dicussed that she should be continue with groups all of next week and we will monitor her progress. Zari shared that she may have paperwork that needs to be completed. She informed this writer that she is taking early  shelter from work. Encouraged Zari to bring in paperwork tomorrow and show it to Marilyn Lora PA-C during her med check. Continue individual psychotherapy in order to progress towards goals..     Treatment Plan Objectives addressed: 1.1, 1.2, 1.3, 1.4 ,1.5      Sagrario Abraham, YURI, LSW

## 2024-09-19 NOTE — PSYCH
Visit Time    Visit Start Time: 0830  Visit Stop Time: 0930  Total Visit Duration:  0 minutes    Subjective:     Patient ID: Zari Nelson is a 49 y.o. female.    [facility] Clinical Progress Notes      Specialized Services Documentation  Therapist must complete separate progress note for each specific clinical activity in which the individual participated during the day.       EDUCATION THERAPY      Zari Nelson was not present for this session.    Tx Plan Objective: 1.1, 1.2, 1.3, 1.4 , Therapist: Volodymyr Leon    Visit Time    Visit Start Time: 1330  Visit Stop Time: 1430  Total Visit Duration:  60 minutes    Subjective:     Patient ID: Zari Nelson is tanner 49 y.o. female.    [facility] Clinical Progress Notes      Specialized Services Documentation  Therapist must complete separate progress note for each specific clinical activity in which the individual participated during the day.       GROUP PSYCHOTHERAPY    Zari Nelson participated actively in psychotherapy group.  This was observed Consistent throughout the treatment day. They were engaged in learning related to Wellness Tools. Staff utilized Verbal, Written, and Demonstration teaching methods.  Zari Nelson shared area of learning and set a goal for outside of program to finish the laundry.  Zari Nelson was able to share items explored during the day and shared in adding to their WRAP.  Ended session with staff led exercise on breathing techniques.  Zari Nelson demonstrated positive progress toward goal.  Continue group psychotherapy to actively practice learned skills and encourage transfer of knowledge to unstructured time.    Tx Plan Objective: 1.1, 1.2, 1.3, 1.4 , Therapist: Volodymyr Leon    Group Psychotherapy    Visit Start Time: (1045)  Visit Stop Time: (1145)  Total Visit Duration:  60 minutes    Subjective:     Patient ID: Zari Nelson 49 y.o.     [facility] Clinical Progress Notes       This group was facilitated in a private office.  Zari LeedavidSkip actively  engaged in psychoeducational group about distress tolerances. The skills introduced help to maintain and regulate emotional fear and distress. Group discovered strategies that help them to manage emotional fear and distress on a short term and long term basis. The group talked about understanding the purpose, and meaning of emotional fear and distress in intellectual and emotional expression, regulation , and recognition, and how it affects themselves and others. Teaching on the skill process of ACCEPTS and DBT self-care, members of the group are able to go  for help or how to manage situations were discussed. Group was encouraged to ask questions in an open forum at the end of group. Positive progress displayed through engagement in topic, Kenisha was actively processing and asking on methods of how to utilize mindfulness and interpersonal relationships in the work environment.  Zari LeeCapriceale will continue to engage in psychotherapy to encourage positive self realization.  Treatment Plan Objective 1.1, 1.2, 1.3, 1.4 Therapist: Volodymyr DREW Ed.

## 2024-09-20 ENCOUNTER — OFFICE VISIT (OUTPATIENT)
Dept: PSYCHIATRY | Facility: CLINIC | Age: 49
End: 2024-09-20

## 2024-09-20 ENCOUNTER — APPOINTMENT (OUTPATIENT)
Dept: PSYCHOLOGY | Facility: CLINIC | Age: 49
End: 2024-09-20
Payer: MEDICARE

## 2024-09-20 DIAGNOSIS — F31.32 BIPOLAR AFFECTIVE DISORDER, CURRENTLY DEPRESSED, MODERATE (HCC): Primary | ICD-10-CM

## 2024-09-20 DIAGNOSIS — F31.62 BIPOLAR DISORDER, CURRENT EPISODE MIXED, MODERATE (HCC): ICD-10-CM

## 2024-09-20 DIAGNOSIS — F43.10 PTSD (POST-TRAUMATIC STRESS DISORDER): Primary | Chronic | ICD-10-CM

## 2024-09-20 DIAGNOSIS — F43.10 PTSD (POST-TRAUMATIC STRESS DISORDER): Chronic | ICD-10-CM

## 2024-09-20 DIAGNOSIS — F41.1 GAD (GENERALIZED ANXIETY DISORDER): Chronic | ICD-10-CM

## 2024-09-20 PROCEDURE — G0177 OPPS/PHP; TRAIN & EDUC SERV: HCPCS

## 2024-09-20 PROCEDURE — G0410 GRP PSYCH PARTIAL HOSP 45-50: HCPCS

## 2024-09-20 NOTE — PSYCH
Progress Note - [facility] [place] [facility]  Zari Nelson 49 y.o. female MRN: [id number]   This note was not shared with the patient due to this is a psychotherapy note    Progress at partial program: some improvement.      Chart reviewed and discussed in treatment team. Zari seen by Dr Wong 9/6 at which time wellbutrin increased to 150 mg/d.  Zari feels she is benefiting from program. Says she has not recently used prn atarax or prazosin and has not been taking zyprexa for unclear reasons.  Is on topamax, adderall and trazodone by OP provider. Primary symptoms reported today: recurrent thoughts of work accompanied by fearfulness, tearfulness; decreased appetite, low energy, loss of interest and isolation.  Denies impulsiveness, euphoric/elated mood, SI/HI.  Denies dizziness, chest pain, tremors.       ROS:   As above otherwise negative    Active Ambulatory Problems     Diagnosis Date Noted    Attention deficit hyperactivity disorder (ADHD) 03/13/2017    Current severe episode of major depressive disorder without psychotic features without prior episode (HCC) 10/19/2012    Bipolar disorder, current episode mixed, moderate ([facility]) 08/16/2023    PTSD (post-traumatic stress disorder) 10/13/2021    EAGLE (generalized anxiety disorder) 10/19/2012    Bipolar affective disorder, currently depressed, moderate ([facility]) 09/06/2024     Resolved Ambulatory Problems     Diagnosis Date Noted    No Resolved Ambulatory Problems     No Additional Past Medical History     No family history on file.  Social History     Socioeconomic History    Marital status: Single     Spouse name: Not on file    Number of children: Not on file    Years of education: Not on file    Highest education level: Not on file   Occupational History    Not on file   Tobacco Use    Smoking status: Not on file    Smokeless tobacco: Not on file   Substance and Sexual Activity    Alcohol use: Not on file    Drug use: Not on file     Sexual activity: Not on file   Other Topics Concern    Not on file   Social History Narrative    Not on file     Social Determinants of Health     Financial Resource Strain: Not At Risk (9/8/2023)    Received from [facility]    Financial Resource Strain     In the last 12 months did you skip medications to save money?: No     In the last 12 months, was there a time when you needed to see a doctor but could not because of cost?: No   Food Insecurity: Not At Risk (9/8/2023)    Received from [facility]    Food Insecurity     In the last 12 months did you ever eat less than you felt you should because there wasn't enough money for food?: No   Transportation Needs: Not At Risk (9/8/2023)    Received from [facility]    Transporation     In the last 12 months, have you ever had to go without healthcare because you didn't have a way to get there?: No   Physical Activity: Not on file   Stress: Not on file   Social Connections: Not At Risk (9/8/2023)    Received from [facility]    Social Connections     Do you often feel lonely?: No   Intimate Partner Violence: Not on file   Housing Stability: Not At Risk (9/8/2023)    Received from [facility]    Housing Stability     Are you worried that in the next 2 months you may not have stable housing?: No     Not on File       Mental Status Evaluation:    Appearance:  age appropriate, casually dressed   Behavior:  cooperative, calm   Speech:  increased latency of response   Mood:  depressed   Affect:  constricted   Thought Process:  goal directed   Associations: intact associations   Thought Content:  no overt delusions   Perceptual Disturbances: none   Risk Potential: Suicidal ideation - None  Homicidal ideation - None   Sensorium:  oriented to person, place, and time/date   Memory:  recent and remote memory grossly intact   Consciousness:  alert and awake    Attention: attention span and concentration are age appropriate   Insight:  intact   Judgment: intact   Gait/Station: normal gait/station   Motor Activity: no abnormal movements       Laboratory results: I have personally reviewed all pertinent laboratory/tests results.      Assessment   Diagnoses and all orders for this visit:    Bipolar affective disorder, currently depressed, moderate (HCC)    EAGLE (generalized anxiety disorder)    PTSD (post-traumatic stress disorder)       Current Outpatient Medications   Medication Sig Dispense Refill    amphetamine-dextroamphetamine (ADDERALL XR) 20 MG 24 hr capsule Take 30 mg by mouth every morning      buPROPion (Wellbutrin XL) 150 mg 24 hr tablet Take 1 tablet (150 mg total) by mouth daily 30 tablet 1    hydrOXYzine HCL (ATARAX) 25 mg tablet Take 25 mg by mouth Three times a day      OLANZapine (ZyPREXA) 2.5 mg tablet Take 2.5 mg by mouth daily at bedtime (Patient not taking: Reported on 9/20/2024)      prazosin (MINIPRESS) 1 mg capsule Take 2 mg by mouth daily      topiramate (TOPAMAX) 200 MG tablet Take 200 mg by mouth daily      traZODone (DESYREL) 50 mg tablet Take 50 mg by mouth daily at bedtime       No current facility-administered medications for this visit.         Plan    Planned medication and treatment changes: No med change. Cont wellbutrin xl 150 mg/d. Has trazodone, topamax and adderall xr by OP provider.  Also has prn atarax and prazosin by OP provider.     All current active medications have been reviewed.  Continue treatment with group therapy and partial program      Risks / Benefits of Treatment:    Risks, benefits, and possible side effects of medications explained to patient and patient verbalizes understanding and agrees to medications.     Counseling / Coordination of Care:    Patient's progress discussed with staff in treatment team meeting.  Medications, treatment progress and treatment plan reviewed with patient.    Marilyn Lora PA-C  09/20/24
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
## 2024-09-20 NOTE — PSYCH
Psychotherapy Group      Visit Start Time: (930)  Visit Stop Time: (1030)  Total Visit Duration:  50 minutes    Subjective:     Patient ID: Zari Nelson 49 y.o.    This group was facilitated in a private office.  Zari eNlson  engaged in psychoeducational group about the PLEASE skill. The skill helps to maintain and regulate emotional expression/regulation. Group discovered strategies that help them to take care of physical and emotional well being on a short term and long term basis. The group talked about understanding the purpose, and meaning of self care in regards to physical, intellectual, and emotional expression, regulation , and recognition, and how it affects themselves and others.. Teaching on the emphasis of self monitoring and self-care, who the group can go to for help was brought up as well. Group was encouraged to ask questions in an open forum at the end of group. Adequate progress displayed through engagement in topic, Zari did not contribute to the group discussion, but was actively engaged in listening to the group discussion. Pt will continue to engage in psychotherapy to encourage self realization in treatment.  Treatment Plan Objective 1.1, 1.2, 1.3, 1.4 Therapist: Volodymyr DREW Ed.
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
## 2024-09-20 NOTE — PSYCH
Visit Time    Visit Start Time: 1045  Visit Stop Time: 1145  Total Visit Duration:  60 minutes    Subjective:     Patient ID: Zari Nelson is a 49 y.o. female.    [facility] Clinical Progress Notes      Specialized Services Documentation  Therapist must complete separate progress note for each specific clinical activity in which the individual participated during the day.       Group Psychotherapy [id number] Group was facilitated virtually in a private office using HIPAA Compliant and Approved Microsoft Teams. Zari Nelson consented to the use of tele-video modality of treatment and was virtually present for group psychotherapy today.  Zari attentively listened to Mary from [facility] share her life story as she co-led this session.  Group encouraged power of learning about self, accepting illness and personal responsibility in recovery.  Community resources reviewed in addition to personal resources like the affirmations.  Progress toward goals noted.  Continue psychotherapy to encourage self -awareness and healthy engagement of supports.   Tx Plan Objective: 1.1,1.2,1.4, Therapist:  MATI Downs, MT-BC

## 2024-09-23 ENCOUNTER — DOCUMENTATION (OUTPATIENT)
Dept: PSYCHOLOGY | Facility: CLINIC | Age: 49
End: 2024-09-23

## 2024-09-23 ENCOUNTER — APPOINTMENT (OUTPATIENT)
Dept: PSYCHOLOGY | Facility: CLINIC | Age: 49
End: 2024-09-23
Payer: MEDICARE

## 2024-09-23 NOTE — PROGRESS NOTES
Subjective:     Patient ID: Zari Nelson is a 49 y.o. female.    [facility] Clinical Progress Notes      Specialized Services Documentation  Therapist must complete separate progress note for each specific clinical activity in which the individual participated during the day.     Case Management Note    YURI Hurtado, ZANW    Current suicide risk : Unable to assess due to absence.    Zari Nelson was excused from  program today 09/23/24 due to having a procedure today.     Medications changes/added/denied? No    Treatment session number: N/A    Individual Case Management Visit provided today? No    [facility] follow up physician's orders: None at this time.

## 2024-09-23 NOTE — PSYCH
Group Psychotherapy    Visit Time    Visit Start Time: (1045)  Visit Stop Time: (1145)  Total Visit Duration: {Psych Total Visit Time:64098}  Subjective:     Patient ID: Zari Nelson 49 y.o.      This group was facilitated in a private office.  Zari Nelson actively engaged in psychoeducational group about challenging automatic negative thoughts that are perceived globally, external factors that influence an individuals thoughts and behaviors.(ANTs). The skill helps to identify negative thoughts and cognitive distortions. The outcome being that negative thoughts are challenged in the thought process and regulation of emotion. Group discovered strategies that help them to manage negative thoughts and rethink the negative thoughts when faced with a negative challenge. The group talked about understanding the purpose of challenging negative thoughts on a personal and social level and how it affects themselves and others.. Teaching on the emphasis of self monitoring and self-care, the group focus is geared how togo to for help when the negative thoughts become overly intrusive. Group was encouraged to ask questions in an open forum at the end of session. *** progress displayed through engagement in topic. Zari Nelson will continue to engage in psychotherapy to encourage positive self realization.  Treatment Plan Objective 1.1, 1.2, 1.3, 1.4 Therapist: Volodymyr DREW Ed.

## 2024-09-24 ENCOUNTER — OFFICE VISIT (OUTPATIENT)
Dept: PSYCHOLOGY | Facility: CLINIC | Age: 49
End: 2024-09-24
Payer: MEDICARE

## 2024-09-24 DIAGNOSIS — F41.1 GAD (GENERALIZED ANXIETY DISORDER): Chronic | ICD-10-CM

## 2024-09-24 DIAGNOSIS — F43.10 PTSD (POST-TRAUMATIC STRESS DISORDER): Chronic | ICD-10-CM

## 2024-09-24 DIAGNOSIS — F31.32 BIPOLAR AFFECTIVE DISORDER, CURRENTLY DEPRESSED, MODERATE (HCC): Primary | ICD-10-CM

## 2024-09-24 PROCEDURE — G0410 GRP PSYCH PARTIAL HOSP 45-50: HCPCS

## 2024-09-24 PROCEDURE — G0176 OPPS/PHP;ACTIVITY THERAPY: HCPCS

## 2024-09-24 PROCEDURE — G0177 OPPS/PHP; TRAIN & EDUC SERV: HCPCS

## 2024-09-24 NOTE — PSYCH
"Visit Time    Visit Start Time: 0930  Visit Stop Time: 1030  Total Visit Duration: 60 minutes     Subjective:     Patient ID: Zari Nelson is a 49 y.o. female.    [facility] Clinical Progress Notes      Specialized Services Documentation  Therapist must complete separate progress note for each specific clinical activity in which the individual participated during the day.       Group Psychotherapy    ([id number]) Zari Nelson actively participated in psychoeducation group this afternoon which focused on sleep hygiene.  Group then identified sleep hygiene habits that are currently effective and habits they wish to incorporate into their night time routine to promote sleep hygiene.  This writer explained the importance of quality sleep in relation to wellness.  Sleep diary and additional tips on sleep hygiene were discussed.  Then, a video titled \"How to Improve Your Sleep\" was viewed. Some progress toward goal observed.  Continue psychoeducation group to increase awareness of good sleeping habits to promote wellness.      Tx Plan Objectives: 1.1, 1.2      Ela LATHAM RN    "
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
## 2024-09-24 NOTE — PSYCH
Subjective:     Patient ID: Zari Nelson is a 49 y.o. female.    [facility] Clinical Progress Notes      Specialized Services Documentation  Therapist must complete separate progress note for each specific clinical activity in which the individual participated during the day.       Visit Time    Visit Start Time: 1320  Visit Stop Time: 1340  Total Visit Duration:  20 minutes      Case Management Note    Current suicide risk : Low     Medications changes/added/denied? No    Treatment session number: 11    Individual Case Management Visit provided today? Yes     [facility] follow up physician's orders: none at this time        INDIVIDUAL PSYCHOTHERAPY     Zari Nelson actively shared in individual therapy.   Zari Nelson shared that she had a good weekend and her pain has improved.  This writer completed Zari's safety care plan with her. Discharge was discussed for Friday or the beginning of next week. Next session follow up to include discussion of the progress and readiness for discharge. Continue individual psychotherapy in order to prepared for discharge.     Treatment Plan Objectives addressed: 1.1, 1.2, 1.3, 1.4       YURI Hurtado, LSW

## 2024-09-24 NOTE — PSYCH
"Visit Time    Visit Start Time: 1045  Visit Stop Time: 1145  Total Visit Duration:  60 minutes    Subjective:     Patient ID: Zari Nelson is a 49 y.o. female.    [facility] Clinical Progress Notes      Specialized Services Documentation  Therapist must complete separate progress note for each specific clinical activity in which the individual participated during the day.     Allied Therapy [id number] Zari Nelson actively participated in music therapy group regarding supports. The group participated in a discussion around supports in their lives and areas where they need support. The group participated in a stew discussion on the song \"Bridge Over Troubled Water\". The group read and discussed a handout on supports. The group listened to \"You've Got a Friend\" and identified the types of support demonstrated within the song. Zari did not share but appeared attentive. Some effort noted towards treatment goal. Continue AT to encourage the development and proactive use of supports.  Tx Plan Objective: 1.1,1.2,1.4, Therapist:  Eusebio Ham, MATI, MT-BC  "

## 2024-09-24 NOTE — PSYCH
Visit Time    Visit Start Time: 0830  Visit Stop Time: 0930  Total Visit Duration:  50 minutes    Subjective:     Patient ID: Zari Nelson is a 49 y.o. female.    [facility] Clinical Progress Notes      Specialized Services Documentation  Therapist must complete separate progress note for each specific clinical activity in which the individual participated during the day.       EDUCATION THERAPY      Zari Nelson actively shared in morning assessment and goal review. Presented as Receptive related to readiness to learn. Zari Nelson  did not complete goal from last treatment day identifying she did not complete phone calls she had to make,gaining advocacy and responsibility. Zari Nelson did not present with any barriers to learning. Throughout morning group, Zari Nelson participated in mindfulness exercise and gratitude practice. Zari Nelson made positive progress toward goal. Continue education group to assess willingness to engage, assess transfer of knowledge, and participate in skill development.    Tx Plan Objective: 1.1, 1.2, 1.3, 1.4 , Therapist: Volodymyr Leon    Visit Time    Visit Start Time: 1330  Visit Stop Time: 1430  Total Visit Duration:  45 minutes    Subjective:     Patient ID: Zari Nelson is a 49 y.o. female.    [facility] Clinical Progress Notes      Specialized Services Documentation  Therapist must complete separate progress note for each specific clinical activity in which the individual participated during the day.       GROUP PSYCHOTHERAPY    Zari Nelson had case management for a portion of this session. Zari  participated actively in psychotherapy group.  This was observed Consistent throughout the treatment day. They were engaged in learning related to Wellness Tools. Staff utilized Verbal and Demonstration teaching methods.  Zari Nelson shared area of learning and set a goal for outside of program to fold  sadie.  Zari Nelson was able to share items explored during the day and shared in adding to their WRAP.  Ended session with staff led exercise on mindful meditation.  Zari Nelson demonstrated positive progress toward goal.  Continue group psychotherapy to actively practice learned skills and encourage transfer of knowledge to unstructured time.    Tx Plan Objective: 1.1, 1.2, 1.3, 1.4 , Therapist: Volodymyr Leon    Group Psychotherapy    Visit Start Time: (1215)  Visit Stop Time: (1315)  Total Visit Duration:  60 minutes      Subjective:     Patient ID: Zari Nelson 49 y.o.    This group was facilitated in a private office.  Zari Nelson actively engaged in psychoeducational group about conflict resolution. The skill helps to develop skills to create resolution with self and others with whom one may interact with. Group discovered strategies that help them to develop positive assertiveness skills on a short term and long term basis. The group talked about understanding the purpose, and meaning of conflict resolution in intellectual and emotional expression, regulation , and recognition, and how it affects themselves and others.Teaching on the emphasis of self monitoring and resolution techniques, discussions on how to create positive resolutions through effective assertive tools were discussed. Group was encouraged to ask questions in an open forum at the end of group. Positive progress displayed through engagement in topic, Zari was an active listener during the entire group session.  Zari Nelson will continue to engage in psychotherapy to encourage positive conflict resolution.  Treatment Plan Objective 1.1, 1.2, 1.3, 1.4 Therapist: Volodymyr DREW Ed.

## 2024-09-25 ENCOUNTER — OFFICE VISIT (OUTPATIENT)
Dept: PSYCHOLOGY | Facility: CLINIC | Age: 49
End: 2024-09-25
Payer: MEDICARE

## 2024-09-25 DIAGNOSIS — F41.1 GAD (GENERALIZED ANXIETY DISORDER): Chronic | ICD-10-CM

## 2024-09-25 DIAGNOSIS — F43.10 PTSD (POST-TRAUMATIC STRESS DISORDER): Chronic | ICD-10-CM

## 2024-09-25 DIAGNOSIS — F31.62 BIPOLAR DISORDER, CURRENT EPISODE MIXED, MODERATE (HCC): Primary | ICD-10-CM

## 2024-09-25 PROCEDURE — G0177 OPPS/PHP; TRAIN & EDUC SERV: HCPCS

## 2024-09-25 PROCEDURE — G0410 GRP PSYCH PARTIAL HOSP 45-50: HCPCS

## 2024-09-25 PROCEDURE — G0176 OPPS/PHP;ACTIVITY THERAPY: HCPCS

## 2024-09-25 NOTE — PSYCH
Visit Time    Visit Start Time: 0930  Visit Stop Time: 1030  Total Visit Duration: 60 minutes    Subjective:     Patient ID: Zari Nelson is a 49 y.o. female.    [facility] Clinical Progress Notes      Specialized Services Documentation  Therapist must complete separate progress note for each specific clinical activity in which the individual participated during the day.       Group Psychotherapy Life Skills (0930-1030) Zari Nelson actively engaged in an open processing.  The group discussed how to make their time in PHP successful and how to maintain their wellness after being discharged. Zari did participate in the conversations related to the topics. Zari continues to make progress towards goals through verbal participation in group; to accomplish long term goals continue to utilize skills learned in programming. Continue with psychotherapy to educate and encourage use of wellness tools. Tx Plan Objective: 1.1,1.2 Therapist: YURI Hurtado LSW      Visit Time    Visit Start Time: NA  Visit Stop Time: NA  Total Visit Duration: NA      Case Management Note    Current suicide risk : Low     Medications changes/added/denied? No    Treatment session number: 12    Individual Case Management Visit provided today? No    [facility] follow up physician's orders: none at this time        INDIVIDUAL PSYCHOTHERAPY     An individual psychotherapy session is not scheduled today with Zari Nelson ; additionally, they did not request a meeting.  Next scheduled session is 9/26/24.    Treatment Plan Objectives addressed: YURI Manuel LSW

## 2024-09-25 NOTE — PSYCH
Group Psychotherapy      Visit Start Time: (1045)  Visit Stop Time: (1145)  Total Visit Duration:  60 minutes    Subjective:     Patient ID: Zari Nelson 49 y.o.    This group was facilitated in a private office.   Zari Nelson engaged in psychoeducational group about unconventional coping strategies. The skills introduced help to maintain and regulate emotions and create healthy non-conventional coping skills. Group discovered strategies that help them to manage emotional stress and create unconventional coping strategies that will help emotional regulation on a short term basis.The group talked about understanding the purpose, and meaning of emotional regulation and the importance of emotional expression, regulation , and recognition, and how it affects themselves and others. Teaching on the skill process of unconventional coping strategies and DBT self-care, members of the group are able to manage short term situations with varied direct tangible coping stratiges. Group was encouraged to ask questions in an open forum at the end of group. Adequate progress displayed through engagement in topic, Zari did contribute once to the group conversation, but displayed a quiet aspect and was not very receptive to the discussion.  Zari Nelson will continue to engage in psychotherapy to encourage positive self realization.  Treatment Plan Objective 1.1, 1.2, 1.3, 1.4 Therapist: Volodymyr DREW Ed.

## 2024-09-25 NOTE — PSYCH
"Visit Time    Visit Start Time: 1215  Visit Stop Time: 1315  Total Visit Duration:  60 minutes    Subjective:     Patient ID: Zari Nelson is a 49 y.o. female.    [facility] Clinical Progress Notes      Specialized Services Documentation  Therapist must complete separate progress note for each specific clinical activity in which the individual participated during the day.     Allied Therapy [id number] Zari Nelson participated in music therapy group regarding writing letters to one's past self. The group discussed past events/points of concern in their lives they felt the need to address. The group listened to and discussed the song \"Letter to Me\". The group then discussed and worked on letter to self worksheet. The group then listened to and discussed the song \"Starting Over\", and discussed an area they hoped to grow following writing their letter to self. Zari did not share but appeared attentive throughout. Some effort noted towards treatment goal. Continue AT to encourage healthy self reflection. Tx Plan Objective: 1.1,1.2,1.4, Therapist:  MATI Downs, MT-BC  "

## 2024-09-25 NOTE — PSYCH
Visit Time    Visit Start Time: 0830  Visit Stop Time: 0930  Total Visit Duration: 40 minutes     Subjective:     Patient ID: Zari Nelson is a 49 y.o. female.    [facility] Clinical Progress Notes      Specialized Services Documentation  Therapist must complete separate progress note for each specific clinical activity in which the individual participated during the day.       EDUCATION THERAPY      Zari Nelson actively shared in morning assessment and goal review. Presented as Receptive related to readiness to learn. Zari Nelson  did complete goal from last treatment day identifying gaining responsibility and support. Zari Nelson did not present with any barriers to learning. Throughout morning group, Zari Nelson participated in  tapping education . Zari Nelson made some progress toward goal. Continue education group to assess willingness to engage, assess transfer of knowledge, and participate in skill development.    Tx Plan Objective: 1.1, 1.2, 1.3, 1.4, Therapist: Ela Wilson RN, BSN     Visit Time    Visit Start Time: 1330  Visit Stop Time: 1430  Total Visit Duration: 60 minutes     Subjective:     Patient ID: Zari Nelson is a 49 y.o. female.    [facility] Clinical Progress Notes      Specialized Services Documentation  Therapist must complete separate progress note for each specific clinical activity in which the individual participated during the day.       GROUP PSYCHOTHERAPY    Zari Nelson participated actively in psychotherapy group.  This was observed Consistent throughout the treatment day. They were engaged in learning related to Illness, Medication, Aftercare, and Wellness Tools. Staff utilized Verbal, Written, A/V, and Demonstration teaching methods.  Zari Nelson shared area of learning and set a goal for outside of program to change bedroom.  Zari Nelson was able to share items explored during the  day and shared in adding to their WRAP.  Ended session with peer  led tapping exercise.  Elad[employer] Nelson demonstrated some progress toward goal.  Continue group psychotherapy to actively practice learned skills and encourage transfer of knowledge to unstructured time.    Tx Plan Objective: 1.1, 1.2, 1.3, 1.4, Therapist: Ela Wilson RN, BSN

## 2024-09-26 ENCOUNTER — OFFICE VISIT (OUTPATIENT)
Dept: PSYCHOLOGY | Facility: CLINIC | Age: 49
End: 2024-09-26
Payer: MEDICARE

## 2024-09-26 DIAGNOSIS — F41.1 GAD (GENERALIZED ANXIETY DISORDER): Chronic | ICD-10-CM

## 2024-09-26 DIAGNOSIS — F31.62 BIPOLAR DISORDER, CURRENT EPISODE MIXED, MODERATE (HCC): Primary | ICD-10-CM

## 2024-09-26 DIAGNOSIS — F43.10 PTSD (POST-TRAUMATIC STRESS DISORDER): Chronic | ICD-10-CM

## 2024-09-26 PROCEDURE — G0410 GRP PSYCH PARTIAL HOSP 45-50: HCPCS

## 2024-09-26 PROCEDURE — G0176 OPPS/PHP;ACTIVITY THERAPY: HCPCS

## 2024-09-26 PROCEDURE — G0177 OPPS/PHP; TRAIN & EDUC SERV: HCPCS

## 2024-09-26 NOTE — PSYCH
Visit Time    Visit Start Time: 1215  Visit Stop Time: 1315  Total Visit Duration:  0 minutes    Subjective:     Patient ID: Zari Nelson is a 49 y.o. female.    [facility] Clinical Progress Notes      Specialized Services Documentation  Therapist must complete separate progress note for each specific clinical activity in which the individual participated during the day.     Allied Therapy [id number] Zari Nelson was not present due to attending case management. Tx Plan Objective: 1.1,1.2,1.4, Therapist:  MATI Downs [facility]      Visit Time    Visit Start Time: 1330  Visit Stop Time: 1430  Total Visit Duration:  60 minutes    GROUP PSYCHOTHERAPY    Zari Nelson participated actively in psychotherapy group.  This was observed Consistent throughout the treatment day. They were engaged in learning related to Illness and Wellness Tools. Staff utilized Verbal, Written, A/V, and Demonstration teaching methods.  Zari Nelson shared area of learning and set a goal for outside of program to  her medication and rearrange her room.  Zari Nelson was able to share items explored during the day and shared in adding to their WRAP.  Ended session with staff led exercise of PMR.  Zari Nelson demonstrated some progress toward goal.  Continue group psychotherapy to actively practice learned skills and encourage transfer of knowledge to unstructured time.    Tx Plan Objective: 1.0, Therapist: MATI Downs [facility]

## 2024-09-26 NOTE — PSYCH
Visit Time    Visit Start Time: 1045  Visit Stop Time: 1145  Total Visit Duration: 60 minutes    Subjective:     Patient ID: Zari Nelson is a 49 y.o. female.    [facility] Clinical Progress Notes      Specialized Services Documentation  Therapist must complete separate progress note for each specific clinical activity in which the individual participated during the day.       Group Psychotherapy Life Skills ([id number]) Zari Nelson actively engaged in group focused on their personal narrative using the tree of life tool.The group watched a SHIVAM talk “The importance of sharing your story” with speaker [employer]. The group created their own tree of life which represents who they are. They had to write words that answered the questions about each part of the tree which represents them. Group members identified what they learned by doing this activity. Zari recognized that she needs more support in order to grow. Zari created a goal to work towards accomplishing the one area that she wants to grow. Group members discussed why it is important to share their story with at least one person. Zari continues to make progress towards goals through verbal participation in group; to accomplish long term goals continue to utilize skills learned in programming. Continue with psychotherapy to educate and encourage use of wellness tools. Tx Plan Objective: 1.1,1.2 Therapist: YURI Hurtado, SHANEL     Visit Time    Visit Start Time: 1220  Visit Stop Time: 1315  Total Visit Duration:  55 minutes      Case Management Note    Current suicide risk : Low     Medications changes/added/denied? No    Treatment session number: 13    Individual Case Management Visit provided today? Yes     [facility] follow up physician's orders: none at this time        INDIVIDUAL PSYCHOTHERAPY     Zari LeeYarielhector actively shared in individual therapy. Zari presented as very tearful.  Zari Nelson  "identified that receiving phone calls and papers from work is very triggering for her. She forwarded this writer disability senior care paperwork for the provider to complete the physician statement section. She reports that she does not know what to do. This writer encouraged her to share what she was referring to. When talking with Zari she repeatedly would state \" I don't know\" and would be shaking her head from side to side. Zari shared that she does not want to return to work and feels that she is stuck and her symptoms are still present. Zari admitted that she has been feeling more scattered in thought, finding it difficult to focus on conversation and tasks, difficulties with completing daily living task, and reports trouble with sleep last night. She did reach out to her  who is a support but reports that she avoids talking about work with everyone due to her level of reaction. Zari states \" I don't want to tell people how I really feel about work because my  already thinks I am paranoid and he will think I am crazy.\" Zari admitted that utilizing coping skills outside of program when she is in a \"crisis\" has been difficult because she forgets them. She reports that she is using coping skills when in group and trying them when at home. She admits that she has not started working on her wrap plan yet. This writer encouraged her to work on WRAP. Crisis card was made during case management and was placed in her cell phone case for easy access when she starts noticing warning signs.  This writer utilized supportive interventions.  Working on treatment plan goals assigned as homework. Fair progress toward goal identified. Next session follow up to include follow up with her working on WRAP. Continue individual psychotherapy in order to progress towards goals and discharge planning.     Treatment Plan Objectives addressed: 1.1, 1.2, 1.3, 1.4       YURI Hurtado, SHANEL       " the same and relevant.     Treatment Plan Objectives addressed: 1.1, 1.2, 1.3, 1.4       YURI Hurtado, LSW

## 2024-09-26 NOTE — PSYCH
Group Psychotherapy      Visit Start Time: (930)  Visit Stop Time: (1030)  Total Visit Duration:  60 minutes    Subjective:     Patient ID: Zari Nelson 49 y.o.     This group was facilitated in a private office.   Zari Nelson participated actively in a psychotherapy group focused on setting boundaries. The group focused on the interpersonal effectiveness pillar of DBT; specifically, looking at the UNA acronym. Participants followed a step by step breakdown of the UNA process and were encouraged to engage in open discussion throughout. Group members were given a UNA practice worksheet and time to practice. Zari Nelson took notes and filled out his practice sheet. Continue to note progress towards goals. Continue with psychotherapy to encourage further development of interpersonal effectiveness skills.  Tx Plan Objective 1.1, 1.2, 1.4 Therapist: AYANA Leon

## 2024-09-26 NOTE — PSYCH
Visit Time    Visit Start Time: 0830  Visit Stop Time: 0930  Total Visit Duration: 60 minutes     Subjective:     Patient ID: Zari Nelson is a 49 y.o. female.    [facility] Clinical Progress Notes      Specialized Services Documentation  Therapist must complete separate progress note for each specific clinical activity in which the individual participated during the day.       EDUCATION THERAPY      Zari Nelson quietly shared in morning assessment and goal review. Presented as Receptive related to readiness to learn. Zari Nelson  did not complete goal from last treatment day identifying wanting to gain responsibility. Zari Nelson did present with any barriers to learning. Throughout morning group, Zari Nelson participated in mindfulness exercise. Zari Nelson made little progress toward goal. Continue education group to assess willingness to engage, assess transfer of knowledge, and participate in skill development.    Tx Plan Objective: 1.1, 1.2, 1.3, 1.4, Therapist: Ela LATHAM RN

## 2024-09-27 ENCOUNTER — OFFICE VISIT (OUTPATIENT)
Dept: PSYCHIATRY | Facility: CLINIC | Age: 49
End: 2024-09-27
Payer: MEDICARE

## 2024-09-27 ENCOUNTER — OFFICE VISIT (OUTPATIENT)
Dept: PSYCHOLOGY | Facility: CLINIC | Age: 49
End: 2024-09-27
Payer: MEDICARE

## 2024-09-27 DIAGNOSIS — F31.32 BIPOLAR AFFECTIVE DISORDER, CURRENTLY DEPRESSED, MODERATE (HCC): Primary | ICD-10-CM

## 2024-09-27 DIAGNOSIS — F41.1 GAD (GENERALIZED ANXIETY DISORDER): Chronic | ICD-10-CM

## 2024-09-27 DIAGNOSIS — F43.10 PTSD (POST-TRAUMATIC STRESS DISORDER): Chronic | ICD-10-CM

## 2024-09-27 PROCEDURE — 99214 OFFICE O/P EST MOD 30 MIN: CPT | Performed by: PHYSICIAN ASSISTANT

## 2024-09-27 PROCEDURE — G0410 GRP PSYCH PARTIAL HOSP 45-50: HCPCS

## 2024-09-27 PROCEDURE — G0177 OPPS/PHP; TRAIN & EDUC SERV: HCPCS

## 2024-09-27 RX ORDER — PRAZOSIN HYDROCHLORIDE 1 MG/1
1 CAPSULE ORAL DAILY
COMMUNITY
Start: 2024-09-27

## 2024-09-27 NOTE — PSYCH
Visit Time    Visit Start Time: 0830  Visit Stop Time: 0930  Total Visit Duration: 60 minutes     Subjective:     Patient ID: Zari Nelson is a 49 y.o. female.    [facility] Clinical Progress Notes      Specialized Services Documentation  Therapist must complete separate progress note for each specific clinical activity in which the individual participated during the day.       EDUCATION THERAPY      Zari Nelson actively shared in morning assessment and goal review. Presented as Receptive related to readiness to learn. Zari Nelson  did complete goal from last treatment day identifying gaining hope. Zari Nelson did not present with any barriers to learning. Throughout morning group, Zari Nelson participated in mindfulness exercise. Zari Nelson made some progress toward goal. Continue education group to assess willingness to engage, assess transfer of knowledge, and participate in skill development.    Tx Plan Objective: 1.1, 1.2, 1.3, 14, Therapist: Ela LATHAM RN    Visit Time    Visit Start Time: 1045  Visit Stop Time: 1145  Total Visit Duration: 60 minutes       Group Psychotherapy - Anxiety     Zari Nelson was present in psychoeducational group about anxiety. The group followed a slide show presentation on Anxiety and the Brain. Group was educated on:  Signs and symptoms of anxiety   Causes and treatments of anxiety  How the brain processes anxiety  Group members then discussed anxiety causing situations and positive ways to cope with these stressors.   Coping skills discussed were breathing paired with sitting/noticing with our thoughts and emotions exercise, challenging irrational thoughts, progressive muscle relaxation, Dropping anchor, deep breathing, and imagery.    Good progress towards goal noted through participation in group. Continue psychoeducational groups on depression to teach the value of learning about diagnosis and  treatments available.     Tx Plan Objective:  1.1, 1.2, 1.3, 1.4 Therapist:  Eal LATHAM RN      Visit Time    Visit Start Time: 1330  Visit Stop Time: 1430  Total Visit Duration: 60 minutes    Subjective:     Patient ID: Zari Nelson is a 49 y.o. y.o.  female.    [facility] Clinical Progress Notes      Specialized Services Documentation  Therapist must complete separate progress note for each specific clinical activity in which the individual participated during the day.       GROUP PSYCHOTHERAPY    [id number]    Zari Nelson participated actively in psychotherapy group.  This was observed Consistent throughout the treatment day. They were engaged in learning related to Illness, Medication, Aftercare, and Wellness Tools. Staff utilized Verbal, Written, A/V, and Demonstration teaching methods.  Zari Nelson shared area of learning and set a goal for outside of program to  medication and change room.  Zari Nelson was able to share items explored during the day and shared in adding to their WRAP.  Ended session with additional Bipolar/Depression education.  Zari Nelsondemonstrated some progress toward goal.  Continue group psychotherapy to actively practice learned skills and encourage transfer of knowledge to unstructured time.      Tx Plan Objective: 1.1, 1.2, 1.3, 1.4, Therapist: Ela LATHAM RN

## 2024-09-27 NOTE — PSYCH
Group Psychotherapy      Visit Start Time: (1045)  Visit Stop Time: (1145)  Total Visit Duration:  60 minutes    Subjective:     Patient ID: Zari Nelson 49 y.o.     This group was facilitated in a private office.  Zari Nelson minimally engaged in psychoeducational group about the Cycle of Change. The skill helps to mange the cycle of recovery and behavior change focused toward a specified goal. Group explored the cycle of change that can help them to take care of physical and emotional well being on a short term and long term basis. The group talked about understanding the meaning of relapse in regards to physical, intellectual, and emotional expression, regulation , and recognition, and how it affects themselves and others. Teaching on the emphasis of self monitoring and relapse,  the group explored who they can go to for help was brought up as well. Group was encouraged to ask questions in an open forum at the end of group. Minimal progress displayed through engagement in topic, Kenisha presented as disinterested and reclusive during this group session. Zari presented with her arms folded and her head facing downward and did not engage in the group discussion.Zari Nelson will continue to engage in psychotherapy to encourage positive self realization.  Treatment Plan Objective 1.1, 1.2, 1.3, 1.4 Therapist: Volodymyr DREW Ed.

## 2024-09-27 NOTE — PSYCH
"Progress Note - [facility] [place] [facility]  Zari Nelson 49 y.o. female MRN: [id number]   This note was not shared with the patient due to this is a psychotherapy note    Progress at partial program: minimal improvement.     Chart reviewed and discussed in treatment team. Zari seen by Asif 9/20 at which time meds unchanged.  Primary symptoms reported today: poor focus and concentration, flashbacks and recurrent thoughts of the past; parasomnias -waking up \"fighting with \" and screaming.  Denies SI.  Is not taking prazosin or zyprexa or trazodone at bedtime. Doesn;t want to be sleepy during the day.  Sleep is poor.  Says she is not currently working on trauma in OP therapy.  Has had EMDR in the past during IOP (?2018) and becomes visibly anxious when remembering it.     ROS:   As above otherwise negative    Active Ambulatory Problems     Diagnosis Date Noted    Attention deficit hyperactivity disorder (ADHD) 03/13/2017    PTSD (post-traumatic stress disorder) 10/13/2021    EAGLE (generalized anxiety disorder) 10/19/2012    Bipolar affective disorder, currently depressed, moderate (HCC) 09/06/2024     Resolved Ambulatory Problems     Diagnosis Date Noted    No Resolved Ambulatory Problems     No Additional Past Medical History     No family history on file.  Social History     Socioeconomic History    Marital status: Single     Spouse name: Not on file    Number of children: Not on file    Years of education: Not on file    Highest education level: Not on file   Occupational History    Not on file   Tobacco Use    Smoking status: Not on file    Smokeless tobacco: Not on file   Substance and Sexual Activity    Alcohol use: Not on file    Drug use: Not on file    Sexual activity: Not on file   Other Topics Concern    Not on file   Social History Narrative    Not on file     Social Determinants of Health     Financial Resource Strain: Not At Risk (9/8/2023)    [facility]    Financial Resource Strain     In the last 12 months did you skip medications to save money?: No     In the last 12 months, was there a time when you needed to see a doctor but could not because of cost?: No   Food Insecurity: Not At Risk (9/8/2023)    Received from [facility]    Food Insecurity     In the last 12 months did you ever eat less than you felt you should because there wasn't enough money for food?: No   Transportation Needs: Not At Risk (9/8/2023)    Received from [facility]    Transporation     In the last 12 months, have you ever had to go without healthcare because you didn't have a way to get there?: No   Physical Activity: Not on file   Stress: Not on file   Social Connections: Not At Risk (9/8/2023)    Received from [facility]    Social Connections     Do you often feel lonely?: No   Intimate Partner Violence: Not on file   Housing Stability: Not At Risk (9/8/2023)    Received from [facility]    Housing Stability     Are you worried that in the next 2 months you may not have stable housing?: No     Not on File       Mental Status Evaluation:    Appearance:  age appropriate, casually dressed   Behavior:  guarded, limited eye contact, restless and fidgety   Speech:  increased latency of response   Mood:  dysphoric, anxious   Affect:  tearful   Thought Process:  goal directed   Associations: intact associations   Thought Content:  no overt delusions   Perceptual Disturbances: none   Risk Potential: Suicidal ideation - None  Homicidal ideation - None   Sensorium:  oriented to person, place, and time/date   Memory:  recent and remote memory grossly intact   Consciousness:  alert and awake   Attention: attention span and concentration are age appropriate   Insight:  intact   Judgment: intact   Gait/Station: normal gait/station   Motor Activity: no abnormal movements        Laboratory results: I have personally reviewed all pertinent laboratory/tests results.      Assessment   Diagnoses and all orders for this visit:    Bipolar affective disorder, currently depressed, moderate (HCC)    EAGLE (generalized anxiety disorder)    PTSD (post-traumatic stress disorder)    Other orders  -     prazosin (MINIPRESS) 1 mg capsule; Take 1 capsule (1 mg total) by mouth daily       Current Outpatient Medications   Medication Sig Dispense Refill    prazosin (MINIPRESS) 1 mg capsule Take 1 capsule (1 mg total) by mouth daily      amphetamine-dextroamphetamine (ADDERALL XR) 20 MG 24 hr capsule Take 30 mg by mouth every morning      buPROPion (Wellbutrin XL) 150 mg 24 hr tablet Take 1 tablet (150 mg total) by mouth daily 30 tablet 1    hydrOXYzine HCL (ATARAX) 25 mg tablet Take 25 mg by mouth Three times a day      OLANZapine (ZyPREXA) 2.5 mg tablet Take 2.5 mg by mouth daily at bedtime (Patient not taking: Reported on 9/20/2024)      topiramate (TOPAMAX) 200 MG tablet Take 200 mg by mouth daily       No current facility-administered medications for this visit.         Plan    Planned medication and treatment changes:  Restart prazosin at 1 mg q bedtime scheduled.  Hold trazodone.  Cont wellbutrin xl 150 mg/d.  Is on topamax, adderall xr per OP provider.  Discussed treatment team's decision not to complete permanent disability/halfway forms.   Discussed treatability of current symptoms and their relationship to untreated trauma.   Will defer all decisions about work to OP providers.     All current active medications have been reviewed.  Continue treatment with group therapy and partial program      Risks / Benefits of Treatment:    Risks, benefits, and possible side effects of medications explained to patient and patient verbalizes understanding and agrees to medications.     Counseling / Coordination of Care:    Patient's progress discussed with staff in treatment team meeting.  Medications,  treatment progress and treatment plan reviewed with patient.    Marilyn Lora PA-C 09/27/24

## 2024-09-27 NOTE — PSYCH
Visit Time    Visit Start Time: 1215  Visit Stop Time: 1315  Total Visit Duration: 60 minutes    Subjective:     Patient ID: Zari Nelson is a 49 y.o. female.    [facility] Clinical Progress Notes      Specialized Services Documentation  Therapist must complete separate progress note for each specific clinical activity in which the individual participated during the day.       Group Psychotherapy Life Skills ([id number]) Zari Nelson actively engaged in group focused on strengths mapping . Group members were instructed to think of a positive experience that they had because they made it positive. Group members shared their positive experience and identified 6 things that they did to make it successful. Zari Nelson shared the positive experience of getting her GED with having her children and identified the strengths that they used to make it successful . The group discussed why knowing their strengths was important. Group members discussed how they could apply their strengths to a current stressor. Zari Nelson continues to make progress towards goals through verbal participation in group; to accomplish long term goals continue to utilize skills learned in programming. Continue with psychotherapy to educate and encourage use of wellness tools. Tx Plan Objective: 1.1,1.2 Therapist: YURI Hurtado,Saul    Visit Time    Visit Start Time: NA  Visit Stop Time: NA  Total Visit Duration: NA      Case Management Note    Current suicide risk : Low     Medications changes/added/denied? Yes - see Marilyn Lora's note    Treatment session number: 14    Individual Case Management Visit provided today? No    [facility] follow up physician's orders: none at this time        INDIVIDUAL PSYCHOTHERAPY     An individual psychotherapy session is not scheduled today with Zari Nelson ; additionally, they did not request a meeting.  Next scheduled session is 9/30/24.    Treatment Plan  Objectives addressed: NOE Abraham, MSW, LSW

## 2024-09-30 ENCOUNTER — OFFICE VISIT (OUTPATIENT)
Dept: PSYCHOLOGY | Facility: CLINIC | Age: 49
End: 2024-09-30
Payer: MEDICARE

## 2024-09-30 DIAGNOSIS — F31.32 BIPOLAR AFFECTIVE DISORDER, CURRENTLY DEPRESSED, MODERATE (HCC): Primary | ICD-10-CM

## 2024-09-30 DIAGNOSIS — F41.1 GAD (GENERALIZED ANXIETY DISORDER): Chronic | ICD-10-CM

## 2024-09-30 DIAGNOSIS — F43.10 PTSD (POST-TRAUMATIC STRESS DISORDER): Chronic | ICD-10-CM

## 2024-09-30 PROCEDURE — G0176 OPPS/PHP;ACTIVITY THERAPY: HCPCS

## 2024-09-30 PROCEDURE — G0410 GRP PSYCH PARTIAL HOSP 45-50: HCPCS

## 2024-09-30 NOTE — PSYCH
Patient ID: Zari Nelson is a 49 y.o. female.     [facility] Clinical Progress Notes      Specialized Services Documentation  Therapist must complete separate progress note for each specific clinical activity in which the individual participated during the day.      Group Psychotherapy    12:15-1:15  Total visit duration: 45 minutes    Zari Nelson participated actively in a psychotherapy group focused on grounding.  Participants were provided with a definition of grounding and how/why the skill can be useful. The group practiced many different types of grounding techniques and exercises to get hands-on experience. Participants were provided with note-cards to create a personal grounding plan, including when to use the skill (indications) and list grounding exercises they found most useful. Group members were encouraged to engage in open discussion, share, and ask questions throughout. Zari Nelson identified 5-4-3-2-1 as a grounding technique they can practice. Continue to note progress towards goals. Continue with psychotherapy to encourage further development of grounding skills.    Tx Plan Objective 1.1, 1.2, 1.4 Therapist: Ashley Costenbader MA LMT

## 2024-09-30 NOTE — PSYCH
Subjective:     Patient ID: Zari Nelson is a 49 y.o. female.    [facility] Clinical Progress Notes      Specialized Services Documentation  Therapist must complete separate progress note for each specific clinical activity in which the individual participated during the day.     Visit Time    Visit Start Time: 0830  Visit Stop Time: 0930  Total Visit Duration: 37 minutes      EDUCATION THERAPY    [id number]    Zari Nelson arrived late but actively shared in morning assessment and goal review. Presented as Receptive related to readiness to learn. Zari Nelson  did complete goal from last treatment day identifying gaining responsibility. Zari Nelson did not present with any barriers to learning. Throughout morning group, Zari Nelson participated in mindfulness exercise and gratitude practice. Zari Nelson made good progress toward goal. Continue education group to assess willingness to engage, assess transfer of knowledge, and participate in skill development.    Tx Plan Objective: 1.1,1.2, Therapist: SHANEL uHrtado       Visit Time    Visit Start Time: 1330  Visit Stop Time: 1430  Total Visit Duration: 60 minutes      GROUP PSYCHOTHERAPY    [id number]    Zari Nelson participated actively in psychotherapy group.  This was observedConsistent throughout the treatment day. They were engaged in learning related to Illness, Medication, Aftercare, and Wellness Tools. Staff utilized Verbal, Written, A/V, and Demonstration teaching methods.  Zari Nelson shared area of learning and set a goal for outside of program to drop off paperwork.  Zari Nelson was able to share items explored during the day. Ended session with staff led exercise in two breathing techniques.  Zari Nelson demonstrated good progress toward goal.  Continue group psychotherapy to actively practice learned skills and encourage transfer of knowledge to unstructured  time.    Tx Plan Objective: 1.1,1.2,1.3,1.4 Therapist: SHANEL Hurtado    Visit Time    Visit Start Time: 1257  Visit Stop Time: 1306  Total Visit Duration:  9 minutes      Case Management Note    Current suicide risk : Low     Medications changes/added/denied? No    Treatment session number: 15    Individual Case Management Visit provided today? Yes     [facility] follow up physician's orders: none at this time        INDIVIDUAL PSYCHOTHERAPY     Zari LeeYarielhector actively shared in individual therapy.   Zari Leslie identified that she has made progress in program. Zari feels ready for discharge for Thursday. She reports that she will be dropping off disability shelter paperwork to her psychiatrist . This writer encouraged her to schedule outpatient appointments. Good progress toward goal identified. Next session follow up to include discharge meeting. Continue individual psychotherapy in order to prepare for discharge .     Treatment Plan Objectives addressed: 1.1, 1.2, 1.3, 1.4       YURI Hurtado, SHANEL

## 2024-09-30 NOTE — PSYCH
Group Psychotherapy      Visit Start Time: (1045)  Visit Stop Time: (1145)  Total Visit Duration:  60 minutes    Group Therapy    Subjective:     Patient ID: Zari Nelson 49 y.o.     This group was facilitated in a private office.   Zari Nelson actively  engaged in psychoeducational group about online support applications to enhance and enrich the group therapy skills. The group came up with strategies that helped them to use the apps and incorporate these apps into self improvement.  The group talked about understanding the purpose,  type, and the timing of when to use these apps. Teaching on emergency situations and who to go to for help was brought up as well. Group was encouraged to ask questions in an open forum at the end of group. Positive progress displayed through engagement in topic, Zari was active in the group discussion.  Zari Nelson will continue to engage in psychotherapy to encourage positive self realization.  Treatment Plan Objective 1.1, 1.2, 1.3, 1.4 Therapist: Volodymyr DREW Ed.

## 2024-10-01 ENCOUNTER — DOCUMENTATION (OUTPATIENT)
Dept: PSYCHIATRY | Facility: CLINIC | Age: 49
End: 2024-10-01

## 2024-10-01 ENCOUNTER — OFFICE VISIT (OUTPATIENT)
Dept: PSYCHOLOGY | Facility: CLINIC | Age: 49
End: 2024-10-01
Payer: MEDICARE

## 2024-10-01 DIAGNOSIS — F43.10 PTSD (POST-TRAUMATIC STRESS DISORDER): Chronic | ICD-10-CM

## 2024-10-01 DIAGNOSIS — F31.32 BIPOLAR AFFECTIVE DISORDER, CURRENTLY DEPRESSED, MODERATE (HCC): Primary | ICD-10-CM

## 2024-10-01 DIAGNOSIS — F41.1 GAD (GENERALIZED ANXIETY DISORDER): Chronic | ICD-10-CM

## 2024-10-01 PROCEDURE — G0410 GRP PSYCH PARTIAL HOSP 45-50: HCPCS

## 2024-10-01 NOTE — PSYCH
Group Psychotherapy      Visit Start Time: (1045)  Visit Stop Time: (1145)  Total Visit Duration:  60 minutes    Subjective:     Patient ID: Zari Nelson 49 y.o.     This group was facilitated in a private office.  Zari Nelson actively engaged in psychoeducational group about the cognitive triad that involves cognitive bias and limiting thoughts that are based on a negative perception of ones self.The skill helps to identify negative thoughts and cognitive biases. The outcome being that negative thoughts are challenged in the thought process and regulation of emotion. Group discovered strategies that help them to manage negative thoughts and rethink the negative thoughts when faced with a negative challenge wether the thought is perceived in the outside environment, or internally as negative self-talk. The group talked about understanding the purpose of challenging negative thoughts on a personal and social level and how it affects themselves and others..Teaching on the emphasis of self monitoring and self-care, the group focus is geared how to go for help when the negative thoughts become overly intrusive. Group was encouraged to ask questions in an open forum at the end of session. Positive progress displayed through engagement in topic, Zari was an active participant in the group discussion.  Zari Nelson will continue to engage in psychotherapy to encourage positive self realization.  Treatment Plan Objective 1.1, 1.2, 1.3, 1.4 Therapist: Volodymyr DREW Ed.

## 2024-10-01 NOTE — PSYCH
Zari reports resolution of previously reported parasomnias/nightmares with d/c of trazodone and restart of prazosin at bedtime.  Scheduled for discharge Thursday and has f/u with OP provider.

## 2024-10-01 NOTE — PSYCH
Visit Time    Visit Start Time: 1215  Visit Stop Time: 1315  Total Visit Duration: 60 minutes      Subjective:     Patient ID: Zari Nelson is a 49 y.o. female.     [facility] Clinical Progress Notes      Specialized Services Documentation  Therapist must complete separate progress note for each specific clinical activity in which the individual participated during the day.      Group Psychotherapy - Anger Management   Zari Nelson participated actively in a psychotherapy group focused on Anger Management. Today group members focused on the warning signs of their anger cycle of anger, evaluating each aspect of the cycle, and apply personal experience to the cycle. Group members also were to complete a personal anger thermometer to track the stages of their anger development. The goal of today was for group members to participate in groups discussion on the cycle of anger, warning signs, and coping techniques. As well as evaluate coping techniques they could utilize to cope with anger and stop the cycle. Members evaluated their anger triggers and identified ways they struggle to work through anger on top of ways they cope. This writer encouraged members to openly share and integrate coping skills into their daily routine. Zari Nelson made good efforts towards progress goals which were displayed through participation, notetaking, and engagement in topic.    Tx Plan Objective: 1.1,1.2,1.4 Therapist: Ashley Costenbader MA LMT        Visit Time    Visit Start Time: 1330  Visit Stop Time: 1430  Total Visit Duration:  60 minutes    Subjective:     Patient ID: Zari Nelson is a 49 y.o. female.    [facility] Clinical Progress Notes      Specialized Services Documentation  Therapist must complete separate progress note for each specific clinical activity in which the individual participated during the day.       GROUP PSYCHOTHERAPY    Zari Nelson participated actively in  psychotherapy group.  This was observed Consistent throughout the treatment day. They were engaged in learning related to Wellness Tools. Staff utilized Verbal, Written, A/V, and Demonstration teaching methods.  Zari Nelson shared area of learning and set a goal for outside of program to work on longterm paper work.  Zari Nelson was able to share items explored during the day and shared in adding to their WRAP.  Ended session with staff led exercise guided mindfulness and journal prompt.  Zari Nelson demonstrated good progress toward goal.  Continue group psychotherapy to actively practice learned skills and encourage transfer of knowledge to unstructured time.    Tx Plan Objective: 1.1,1.2, Therapist: Ashley Costenbader, MA LMT
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
## 2024-10-02 ENCOUNTER — APPOINTMENT (OUTPATIENT)
Dept: PSYCHOLOGY | Facility: CLINIC | Age: 49
End: 2024-10-02
Payer: MEDICARE

## 2024-10-02 ENCOUNTER — DOCUMENTATION (OUTPATIENT)
Dept: PSYCHOLOGY | Facility: CLINIC | Age: 49
End: 2024-10-02

## 2024-10-02 DIAGNOSIS — F31.32 BIPOLAR AFFECTIVE DISORDER, CURRENTLY DEPRESSED, MODERATE (HCC): Primary | ICD-10-CM

## 2024-10-02 DIAGNOSIS — F41.1 GAD (GENERALIZED ANXIETY DISORDER): Chronic | ICD-10-CM

## 2024-10-02 DIAGNOSIS — F43.10 PTSD (POST-TRAUMATIC STRESS DISORDER): Chronic | ICD-10-CM

## 2024-10-02 NOTE — PROGRESS NOTES
[facility] Discharge Instructions:   Disposition: Home  Address: [address] [place] [id number] .   Diagnosis:  1. Bipolar affective disorder, currently depressed, moderate (HCC)        2. EAGLE (generalized anxiety disorder)        3. PTSD (post-traumatic stress disorder)          .   Allergies (Drug/Food): Not on File    Activity: No recommendations   Diet:no recommendations  Smoking Cessation:not a smoker   Diagnostic/Laboratory Orders: none    Vaccines: If you received a vaccine, please notify your family physician on your next visit. For more information, please call [phone].     Follow-up appointments/Referrals:     Current Psychiatrist/Therapist:   Medication Management:   Joe Samayoa  [address]  [facility] [address]  [phone]  Follow up: 10/2/24 at 11:00am     Outpatient Therapy:   Carlene Oh  [address]  [place] [address]  [phone]  Follow up: 10/7/24 at 5:45pm     Primary Care Physician:  [place] [address]  [phone]     Outpatient and/or Partial and Other Community Resources Used (CTT, ICM, VNA):  Kelly Galindo who is a lead practitioner with [facility] with billing address of [address] [place] [address] . Her servicing address is [address] [place] [address]. Her phone number is [phone].        [facility]: [place] [phone] and [place] [phone].    Intake/Referral/Evaluation (Non-Emergency) *NON INSURED FOR FUNDING: [facility]: [phone], [facility]: [phone], [facility]: [phone] and [facility]: [phone]. Crisis Intervention (Emergency) County Service: [facility]: [phone], [place]: [phone], [place]: [phone], [facility]: [phone], [place]: [phone] and [facility]: [phone].  _________________________________  National Crisis Intervention Hotline: 1-251.717.8446.  National Suicide Crisis Hotline: 1-245.283.9325.     I, the undersigned, have received and understand the above instructions.        Patient/Rep Signature: __________________________________       Date/Time: ______________       Physician Signature: ____________________________________      Date/Time: ______________               Signature: ________________________________       Date/Time: ______________

## 2024-10-02 NOTE — PROGRESS NOTES
Subjective:     Patient ID: Zari Nelson is a 49 y.o. female.    [facility] Clinical Progress Notes      Specialized Services Documentation  Therapist must complete separate progress note for each specific clinical activity in which the individual participated during the day.       Case Management Note    YURI Hurtado    Current suicide risk : Unable to assess due to absence.    Zari Nelson was excused from  program today 10/02/24 due to appointment with her psychiatrist .    Medications changes/added/denied? No    Treatment session number: N/A    Individual Case Management Visit provided today? No    [facility] follow up physician's orders: None at this time.

## 2024-10-03 ENCOUNTER — OFFICE VISIT (OUTPATIENT)
Dept: PSYCHOLOGY | Facility: CLINIC | Age: 49
End: 2024-10-03
Payer: MEDICARE

## 2024-10-03 DIAGNOSIS — F31.32 BIPOLAR AFFECTIVE DISORDER, CURRENTLY DEPRESSED, MODERATE (HCC): Primary | ICD-10-CM

## 2024-10-03 DIAGNOSIS — F43.10 PTSD (POST-TRAUMATIC STRESS DISORDER): Chronic | ICD-10-CM

## 2024-10-03 DIAGNOSIS — F41.1 GAD (GENERALIZED ANXIETY DISORDER): Chronic | ICD-10-CM

## 2024-10-03 PROCEDURE — G0410 GRP PSYCH PARTIAL HOSP 45-50: HCPCS

## 2024-10-03 PROCEDURE — 90832 PSYTX W PT 30 MINUTES: CPT
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
## 2024-10-03 NOTE — PSYCH
Group Psychotherapy      Visit Start Time: (1215)  Visit Stop Time: (1315)  Total Visit Duration:  0 minutes    Subjective:     Patient ID: Zari Nelson 49 y.o.    This group was facilitated in a private office.  Zari Nelson was not present for this session. Zari actively engaged in psychoeducational group about emotional regulation. The skill helps to maintain and regulate emotional expression/regulation. Group discovered strategies that help them to manage emotional well being on a short term and long term basis. The group talked about understanding the purpose, and meaning of emotional regulation in intellectual and emotional expression, regulation , and recognition, and how it affects themselves and others.. Teaching on the emphasis of self monitoring and self-care, who the group can go to for help was brought up as well. Group was encouraged to ask questions in an open forum at the end of group.  Zari Nelson will continue to engage in psychotherapy to encourage positive self realization.  Treatment Plan Objective 1.1, 1.2, 1.3, 1.4 Therapist: Volodymyr DREW Ed.

## 2024-10-03 NOTE — PSYCH
"Visit Time    Visit Start Time: 0930  Visit Stop Time: 1030  Total Visit Duration:  30 minutes    Subjective:     Patient ID: Zari Nelson is a 49 y.o. female.    [facility] Clinical Progress Notes      Specialized Services Documentation  Therapist must complete separate progress note for each specific clinical activity in which the individual participated during the day.     Allied Therapy [id number] Zari Nelson actively participated in music therapy group regarding time management. The group listened to and discussed the song \"Time\" and how it relates to their philosophy of time management. The group participated in a discussion about time management in there lives and where they could use improvement. The group read and discussed handouts about tips for time management, time wasters, and daily/weekly schedules. The group then discussed the pomodoro method of time management and created a music playlist to as a method of tracking the 25 minute module. Zari left to attend case management before sharing. Some effort noted towards treatment goal. Continue AT to encourage the development and proactive use of time management skills. Tx Plan Objective: 1.1,1.2,1.4, Therapist:  MATI Downs, MT-BC  "

## 2024-10-03 NOTE — PSYCH
Subjective:     Patient ID: Zari Nelson is a 49 y.o. female.    [facility] Clinical Progress Notes      Specialized Services Documentation  Therapist must complete separate progress note for each specific clinical activity in which the individual participated during the day.       Visit Time    Visit Start Time: 1045  Visit Stop Time: 1145  Total Visit Duration: 60 minutes    Group Psychotherapy Life Skills ([id number]) Zari Nelson actively engaged in an open processing group. The group discussed discrimination and how to set boundaries with people who are discriminating . Zari did participate in the conversations related to the topics. Zari continues to make progress towards goals through verbal participation in group; to accomplish long term goals continue to utilize skills learned in programming. Continue with psychotherapy to educate and encourage use of wellness tools. Tx Plan Objective: 1.1,1.2 Therapist: YURI Hurtado     Visit Time    Visit Start Time: 0830  Visit Stop Time: 0930  Total Visit Duration: 43  minutes    EDUCATION THERAPY    [id number]    Zari Nelson actively shared in morning assessment and goal review. Presented as Receptive related to readiness to learn. Zari Nelson  did complete goal from last treatment day identifying gaining responsibility and advocate . Zari Nelson did not present with any barriers to learning. Throughout morning group, Zari Nelson participated in mindfulness exercise and giving and acknowledging complements. Zari Nelson made good progress toward goal. Continue education group to assess willingness to engage, assess transfer of knowledge, and participate in skill development.    Tx Plan Objective: 1.1,1.2, Therapist: SHANEL Hurtado     Visit Time    Visit Start Time: 1330  Visit Stop Time: 1430  Total Visit Duration: 0 minutes    GROUP PSYCHOTHERAPY    [id number]    Beekenneditanner RosaKari  was not present for wrap up.     Tx Plan Objective: 1.1,1.2,1.3,1.4 Therapist: SHANEL Hurtado      Case Management Note    YURI Hurtado, SHANEL    Current suicide risk : Low     ([id number]) CM reviewed Crisis Plan, discharge instructions, medication list and crisis information with Zari Nelson.  See discharge summary for treatment details. Aftercare providers to receive discharge summary.        Medications changes/added/denied? No    Treatment session number: 17    Individual Case Management Visit provided today? Yes     [facility] follow up physician's orders: Discharge - See Dr. Wong's Note

## 2024-10-03 NOTE — PSYCH
"Assessment/Plan:         Assessment  Diagnoses and all orders for this visit:     Bipolar affective disorder, currently depressed, moderate (HCC)     EAGLE (generalized anxiety disorder)     PTSD (post-traumatic stress disorder)              Subjective:        Subjective  Patient ID: Zari Nelson is a 49 y.o. female.     [facility] Treatment Plan   AREAS OF NEED: Bipolar affective disorder, currently depressed, moderate, EAGLE, and PTSD as evidenced by struggling with concentration issues, excessive crying, isolation, decrease in appetite having lost 7lb in a short amount of time,difficulties with sleep, nightmares and has avoidant behavior due to past traumas and work stressors.  Date Initiated: 09/06/24     Strengths:  \"I raised 12 children \"           LONG TERM GOAL:   Date Initiated: 09/06/24  1.0 I will identify and share three ways in which my day-to-day functioning has improved since attending program.  Target Date: 10/4/24  Completion Date: 10/3/24- discharge         SHORT TERM OBJECTIVES:      Date Initiated: 09/06/24  1.1 I will attend group daily and actively participate in at least two groups per day to build a natural support network and feel more socially connected to improve my moods.  Revision Date: 9/17/24;10/3/24- discharge   Target Date: 9/17/24  Completion Date: 10/3/24- discharge      Date Initiated: 09/06/24  1.2 I will decrease negative statements of self while increasing the positive statements of self and will acknowledge the compliments of others in order to increase my self-esteem.  Revision Date: 9/17/24;10/3/24- discharge   Target Date: 9/17/24  Completion Date:10/3/24- discharge      Date Initiated: 09/06/24  1.3 I will take medications as prescribed and share questions and concerns if arise.    Revision Date:9/17/24;10/3/24- discharge   Target Date: 9/17/24  Completion Date: 10/3/24- discharge      Date Initiated: 09/06/24  1.4 I will identify 3 ways my supports can assist in my " recovery and agree to staff/support contact as indicated.    Revision Date:9/17/24; 10/3/24- discharge   Target Date: 9/17/24  Completion Date:10/3/24- discharge             7 DAY REVISION:   1.5 On a daily basis I will learn and practice a new coping skill to help improve my mood.   Date Initiated:9/17/24  Revision Date: 9/26/24- treatment plan not updated due to crisis- goals remained the same and relevant; 10/3/24- discharge   Target Date: 9/26/24  Completion Date:10/3/24- discharge         PSYCHIATRY:  Date Initiated:  09/06/24  Medication Management and Education       Revision Date: 09/17/24; 10/3/24- discharge        1.3 Continue medication management         The person(s) responsible for carrying out the plan is Elvin Wong DO and Marilyn Lora PA-C     NURSING/SYMPTOM EDUCATION:   Date Initiated: 09/06/24  1.1, 1.2. 1.3, 1.4 This RN/Or Therapist will provide wellness/symptoms and skill education groups three to five days weekly to educate Zari Nelson on signs and symptoms of diagnoses, skills to manage, and medication questions that will be addressed by the treatment team.    Revision date:09/17/24 ; 10/3/24- discharge   1.1,1.2,1.3,1.4,1.5 Continue to encourage Zari Nelson to participate in wellness/symptoms and skills education groups daily to learn about symptoms, coping strategies and warning signs to promote relapse prevention.      The person(s) responsible for carrying out the plan is Janet Feliciano; Ela Maldonado, RN, BSN     PSYCHOLOGY:   Date Initiated: 09/06/24       1.1, 1.2, 1.4 Provide psychotherapy group 5 times per week to allow opportunity for Zari Nelson  to explore stressors and ways of coping.   Revision Date: 09/17/24 ; 10/3/24- discharge   1.1,1.2,1.4,1.5  Continue to provide psychotherapy group daily to Zari Nelson and encourage sharing of stressors, skills and positive change.     The person(s) responsible for carrying out the plan is  YURI Hurtado,SHANEL; Ashley Costenbader, MA LMT     ALLIED THERAPY:   Date Initiated: 09/06/24  1.1,1.2 Engage Zari Nelson in AT group 5 times weekly to encourage development and use of wellness tools to decrease symptoms and promote recovery through meaningful activity.  Revision Date: 09/17/24 ; 10/3/24- discharge   1.1,1.2,1.5 Continue to engage Zari Nelson to participate in AT group to practice wellness tools within program and transfer to home sharing successes and barriers through healthy task involvement.         The person(s) responsible for carrying out the plan is VICTORINO RosadoBC ; MATI Downs MT-BC     CASE MANAGEMENT:   Date Initiated: 09/06/24      1.0 This  will meet with Zari Nelson  3-4 times weekly to assess treatment progress, discharge planning, connection to community supports and UR as indicated.  Revision Date: 09/17/24 ; 10/3/24- discharge   1.0 Continue to meet with Zari Nelson 3-4 times weekly to assess growth, work toward goals, continued treatment needs, dc planning and use of supports.     The person(s) responsible for carrying out the plan is YURI Hurtado,ZANW     TREATMENT REVIEW/COMMENTS:      DISCHARGE CRITERIA: Identify 3 signs of progress and complete a safety plan.    DISCHARGE PLAN: Connect with identified outpatient providers.   Estimated Length of Stay: 10 treatment days                Diagnosis and Treatment Plan explained to Zari Hameed relates understanding diagnosis and is agreeable to Treatment Plan.         CLIENT COMMENTS / Please share your thoughts, feelings, need and/or experiences regarding your treatment plan with Staff.  Please see follow up note with comments.     Signatures can be found on [facility] Treatment plan consent form.